# Patient Record
Sex: MALE | Race: BLACK OR AFRICAN AMERICAN | NOT HISPANIC OR LATINO | Employment: FULL TIME | URBAN - METROPOLITAN AREA
[De-identification: names, ages, dates, MRNs, and addresses within clinical notes are randomized per-mention and may not be internally consistent; named-entity substitution may affect disease eponyms.]

---

## 2017-05-22 ENCOUNTER — HISTORICAL (OUTPATIENT)
Dept: ADMINISTRATIVE | Facility: HOSPITAL | Age: 52
End: 2017-05-22

## 2017-05-22 LAB — CRC RECOMMENDATION EXT: NORMAL

## 2021-08-25 ENCOUNTER — HISTORICAL (OUTPATIENT)
Dept: FAMILY MEDICINE | Facility: CLINIC | Age: 56
End: 2021-08-25

## 2021-08-25 LAB
ABS NEUT (OLG): 3.44 X10(3)/MCL (ref 2.1–9.2)
ALBUMIN SERPL-MCNC: 4.1 GM/DL (ref 3.5–5)
ALBUMIN/GLOB SERPL: 1 RATIO (ref 1.1–2)
ALP SERPL-CCNC: 48 UNIT/L (ref 40–150)
ALT SERPL-CCNC: 29 UNIT/L (ref 0–55)
AST SERPL-CCNC: 23 UNIT/L (ref 5–34)
BASOPHILS NFR BLD MANUAL: 0 %
BILIRUB SERPL-MCNC: 0.2 MG/DL
BILIRUBIN DIRECT+TOT PNL SERPL-MCNC: 0.1 MG/DL (ref 0–0.5)
BILIRUBIN DIRECT+TOT PNL SERPL-MCNC: 0.1 MG/DL (ref 0–0.8)
BUN SERPL-MCNC: 11.9 MG/DL (ref 8.4–25.7)
CALCIUM SERPL-MCNC: 9.9 MG/DL (ref 8.4–10.2)
CHLORIDE SERPL-SCNC: 106 MMOL/L (ref 98–107)
CHOLEST SERPL-MCNC: 222 MG/DL
CHOLEST/HDLC SERPL: 5 {RATIO} (ref 0–5)
CO2 SERPL-SCNC: 25 MMOL/L (ref 22–29)
CREAT SERPL-MCNC: 1.32 MG/DL (ref 0.73–1.18)
EOSINOPHIL NFR BLD MANUAL: 1 %
ERYTHROCYTE [DISTWIDTH] IN BLOOD BY AUTOMATED COUNT: 14.2 % (ref 11.5–14.5)
EST. AVERAGE GLUCOSE BLD GHB EST-MCNC: 111.2 MG/DL
GLOBULIN SER-MCNC: 4.1 GM/DL (ref 2.4–3.5)
GLUCOSE SERPL-MCNC: 96 MG/DL (ref 74–100)
GRANULOCYTES NFR BLD MANUAL: 38 % (ref 43–75)
HBA1C MFR BLD: 5.5 %
HCT VFR BLD AUTO: 46.7 % (ref 40–51)
HDLC SERPL-MCNC: 46 MG/DL (ref 35–60)
HGB BLD-MCNC: 15.4 GM/DL (ref 13.5–17.5)
LDLC SERPL CALC-MCNC: 141 MG/DL (ref 50–140)
LYMPHOCYTES NFR BLD MANUAL: 58 % (ref 20.5–51.1)
MCH RBC QN AUTO: 28.3 PG (ref 26–34)
MCHC RBC AUTO-ENTMCNC: 33 GM/DL (ref 31–37)
MCV RBC AUTO: 85.7 FL (ref 80–100)
MONOCYTES NFR BLD MANUAL: 3 % (ref 2–9)
PLATELET # BLD AUTO: 318 X10(3)/MCL (ref 130–400)
PLATELET # BLD EST: ADEQUATE 10*3/UL
PMV BLD AUTO: 11 FL (ref 7.4–10.4)
POTASSIUM SERPL-SCNC: 4.5 MMOL/L (ref 3.5–5.1)
PROT SERPL-MCNC: 8.2 GM/DL (ref 6.4–8.3)
PSA SERPL-MCNC: 0.33 NG/ML
RBC # BLD AUTO: 5.45 X10(6)/MCL (ref 4.5–5.9)
RBC MORPH BLD: NORMAL
SODIUM SERPL-SCNC: 142 MMOL/L (ref 136–145)
TRIGL SERPL-MCNC: 173 MG/DL (ref 34–140)
TSH SERPL-ACNC: 0.62 UIU/ML (ref 0.35–4.94)
VLDLC SERPL CALC-MCNC: 35 MG/DL
WBC # SPEC AUTO: 9.8 X10(3)/MCL (ref 4.5–11)

## 2021-09-10 ENCOUNTER — HISTORICAL (OUTPATIENT)
Dept: RADIOLOGY | Facility: HOSPITAL | Age: 56
End: 2021-09-10

## 2021-09-20 ENCOUNTER — HISTORICAL (OUTPATIENT)
Dept: ADMINISTRATIVE | Facility: HOSPITAL | Age: 56
End: 2021-09-20

## 2021-09-21 ENCOUNTER — HISTORICAL (OUTPATIENT)
Dept: ADMINISTRATIVE | Facility: HOSPITAL | Age: 56
End: 2021-09-21

## 2021-09-21 LAB
BUN SERPL-MCNC: 10.7 MG/DL (ref 8.4–25.7)
CALCIUM SERPL-MCNC: 10.6 MG/DL (ref 8.4–10.2)
CHLORIDE SERPL-SCNC: 102 MMOL/L (ref 98–107)
CO2 SERPL-SCNC: 30 MMOL/L (ref 22–29)
CREAT SERPL-MCNC: 1.1 MG/DL (ref 0.73–1.18)
CREAT/UREA NIT SERPL: 10
GLUCOSE SERPL-MCNC: 93 MG/DL (ref 74–100)
POTASSIUM SERPL-SCNC: 4.4 MMOL/L (ref 3.5–5.1)
SODIUM SERPL-SCNC: 140 MMOL/L (ref 136–145)

## 2021-12-21 ENCOUNTER — HISTORICAL (OUTPATIENT)
Dept: ADMINISTRATIVE | Facility: HOSPITAL | Age: 56
End: 2021-12-21

## 2021-12-21 LAB — TESTOST SERPL-MCNC: 437.17 NG/DL (ref 220.91–715.81)

## 2022-04-10 ENCOUNTER — HISTORICAL (OUTPATIENT)
Dept: ADMINISTRATIVE | Facility: HOSPITAL | Age: 57
End: 2022-04-10
Payer: COMMERCIAL

## 2022-04-27 VITALS
HEIGHT: 68 IN | DIASTOLIC BLOOD PRESSURE: 75 MMHG | BODY MASS INDEX: 24.96 KG/M2 | OXYGEN SATURATION: 98 % | SYSTOLIC BLOOD PRESSURE: 125 MMHG | WEIGHT: 164.69 LBS

## 2022-04-30 NOTE — OP NOTE
Patient:   Harjit Handley             MRN: [id number]            FIN: [id number]               Age:   51 years     Sex:  Male     :  1965   Associated Diagnoses:   None   Author:   Pooja Harper MD      Operative Note   Operative Information   Date/ Time:  2017 08:14:00.     Procedures Performed: Colonoscopy with biopsy.     Preoperative Diagnosis: Family history of colon cancer.     Postoperative Diagnosis: 1.  Colon polyps, small, descending colon ×2.  Biopsies done  2.  Internal hemorrhoids grade 1-2.  Otherwise normal exam preparation was fair.     Surgeon: Pooja Harper MD.     Assistant: Jese Qiu Jones, [place].     Anesthesia: Per anesthesia service.     Speciman Removed: Yes.     Esimated blood loss: loss  3  cc.     Description of Procedure/Findings/    Complications: History and physical as per preoperative note.  Informed consent was obtained.  Patient was placed in left lateral position.  Sedation per anesthesia service.  Rectal exam was unremarkable.  Olympus video colonoscope was introduced into the rectum and was carefully advanced to cecum.  Quality of the prep was fair after thorough irrigation..     Findings: Cecum, ascending colon and transverse colon appeared unremarkable.  There were 2 small polyps, 3-4 mm, noted in proximal descending colon.  Biopsies were done.  Sigmoid colon and rectum appeared unremarkable except grade 1-2 internal hemorrhoids noted on withdrawal of the scope.  Estimated withdrawal time from cecum to rectum was 19 minutes and 18 seconds.  There were no complications..     Complications: None.     Recommendations:  1.  Follow biopsy results.  2.  Repeat colonoscopy in 5 years pending biopsy results in view of family history of colon cancer..

## 2022-07-08 RX ORDER — ROSUVASTATIN CALCIUM 10 MG/1
TABLET, COATED ORAL
Qty: 30 TABLET | Refills: 11 | Status: SHIPPED | OUTPATIENT
Start: 2022-07-08 | End: 2022-09-21 | Stop reason: SDUPTHER

## 2022-07-08 RX ORDER — AMLODIPINE BESYLATE 10 MG/1
TABLET ORAL
Qty: 30 TABLET | Refills: 11 | Status: SHIPPED | OUTPATIENT
Start: 2022-07-08 | End: 2022-09-21

## 2022-09-08 DIAGNOSIS — Z12.2 SCREENING FOR MALIGNANT NEOPLASM OF RESPIRATORY ORGAN: Primary | ICD-10-CM

## 2022-09-21 ENCOUNTER — OFFICE VISIT (OUTPATIENT)
Dept: FAMILY MEDICINE | Facility: CLINIC | Age: 57
End: 2022-09-21
Payer: COMMERCIAL

## 2022-09-21 VITALS
OXYGEN SATURATION: 100 % | WEIGHT: 156.19 LBS | RESPIRATION RATE: 20 BRPM | HEART RATE: 100 BPM | TEMPERATURE: 98 F | HEIGHT: 68 IN | DIASTOLIC BLOOD PRESSURE: 81 MMHG | SYSTOLIC BLOOD PRESSURE: 136 MMHG | BODY MASS INDEX: 23.67 KG/M2

## 2022-09-21 DIAGNOSIS — R09.81 NASAL CONGESTION: ICD-10-CM

## 2022-09-21 DIAGNOSIS — K21.9 GASTROESOPHAGEAL REFLUX DISEASE, UNSPECIFIED WHETHER ESOPHAGITIS PRESENT: ICD-10-CM

## 2022-09-21 DIAGNOSIS — K21.9 GASTROESOPHAGEAL REFLUX DISEASE WITHOUT ESOPHAGITIS: ICD-10-CM

## 2022-09-21 DIAGNOSIS — E78.49 OTHER HYPERLIPIDEMIA: ICD-10-CM

## 2022-09-21 DIAGNOSIS — N52.9 ERECTILE DYSFUNCTION, UNSPECIFIED ERECTILE DYSFUNCTION TYPE: ICD-10-CM

## 2022-09-21 DIAGNOSIS — N52.8 OTHER MALE ERECTILE DYSFUNCTION: ICD-10-CM

## 2022-09-21 DIAGNOSIS — F32.A DEPRESSION, UNSPECIFIED DEPRESSION TYPE: ICD-10-CM

## 2022-09-21 DIAGNOSIS — I10 PRIMARY HYPERTENSION: ICD-10-CM

## 2022-09-21 DIAGNOSIS — E78.5 HYPERLIPIDEMIA, UNSPECIFIED HYPERLIPIDEMIA TYPE: ICD-10-CM

## 2022-09-21 DIAGNOSIS — I10 HYPERTENSION, UNSPECIFIED TYPE: Primary | ICD-10-CM

## 2022-09-21 DIAGNOSIS — R79.89 LOW VITAMIN D LEVEL: ICD-10-CM

## 2022-09-21 DIAGNOSIS — K59.00 CONSTIPATION, UNSPECIFIED CONSTIPATION TYPE: ICD-10-CM

## 2022-09-21 DIAGNOSIS — G47.00 INSOMNIA, UNSPECIFIED TYPE: ICD-10-CM

## 2022-09-21 DIAGNOSIS — Z86.010 HISTORY OF ADENOMATOUS POLYP OF COLON: ICD-10-CM

## 2022-09-21 DIAGNOSIS — F51.01 PRIMARY INSOMNIA: ICD-10-CM

## 2022-09-21 LAB
ALBUMIN SERPL-MCNC: 4.2 GM/DL (ref 3.5–5)
ALBUMIN/GLOB SERPL: 1 RATIO (ref 1.1–2)
ALP SERPL-CCNC: 62 UNIT/L (ref 40–150)
ALT SERPL-CCNC: 20 UNIT/L (ref 0–55)
AST SERPL-CCNC: 23 UNIT/L (ref 5–34)
BASOPHILS # BLD AUTO: 0.09 X10(3)/MCL (ref 0–0.2)
BASOPHILS NFR BLD AUTO: 0.7 %
BILIRUBIN DIRECT+TOT PNL SERPL-MCNC: 0.4 MG/DL
BUN SERPL-MCNC: 13.5 MG/DL (ref 8.4–25.7)
CALCIUM SERPL-MCNC: 10.2 MG/DL (ref 8.4–10.2)
CHLORIDE SERPL-SCNC: 104 MMOL/L (ref 98–107)
CHOLEST SERPL-MCNC: 149 MG/DL
CHOLEST/HDLC SERPL: 4 {RATIO} (ref 0–5)
CO2 SERPL-SCNC: 24 MMOL/L (ref 22–29)
CREAT SERPL-MCNC: 1.13 MG/DL (ref 0.73–1.18)
EOSINOPHIL # BLD AUTO: 0.14 X10(3)/MCL (ref 0–0.9)
EOSINOPHIL NFR BLD AUTO: 1.1 %
ERYTHROCYTE [DISTWIDTH] IN BLOOD BY AUTOMATED COUNT: 15 % (ref 11.5–17)
EST. AVERAGE GLUCOSE BLD GHB EST-MCNC: 102.5 MG/DL
FLUAV AG UPPER RESP QL IA.RAPID: NOT DETECTED
FLUBV AG UPPER RESP QL IA.RAPID: NOT DETECTED
GFR SERPLBLD CREATININE-BSD FMLA CKD-EPI: >60 MLS/MIN/1.73/M2
GLOBULIN SER-MCNC: 4.4 GM/DL (ref 2.4–3.5)
GLUCOSE SERPL-MCNC: 96 MG/DL (ref 74–100)
HBA1C MFR BLD: 5.2 %
HCT VFR BLD AUTO: 47.4 % (ref 42–52)
HDLC SERPL-MCNC: 36 MG/DL (ref 35–60)
HGB BLD-MCNC: 15.4 GM/DL (ref 14–18)
IMM GRANULOCYTES # BLD AUTO: 0.03 X10(3)/MCL (ref 0–0.04)
IMM GRANULOCYTES NFR BLD AUTO: 0.2 %
LDLC SERPL CALC-MCNC: 87 MG/DL (ref 50–140)
LYMPHOCYTES # BLD AUTO: 5.48 X10(3)/MCL (ref 0.6–4.6)
LYMPHOCYTES NFR BLD AUTO: 43.6 %
MCH RBC QN AUTO: 27.5 PG (ref 27–31)
MCHC RBC AUTO-ENTMCNC: 32.5 MG/DL (ref 33–36)
MCV RBC AUTO: 84.5 FL (ref 80–94)
MONOCYTES # BLD AUTO: 0.95 X10(3)/MCL (ref 0.1–1.3)
MONOCYTES NFR BLD AUTO: 7.6 %
NEUTROPHILS # BLD AUTO: 5.9 X10(3)/MCL (ref 2.1–9.2)
NEUTROPHILS NFR BLD AUTO: 46.8 %
NRBC BLD AUTO-RTO: 0 %
PLATELET # BLD AUTO: 405 X10(3)/MCL (ref 130–400)
PMV BLD AUTO: 10.1 FL (ref 7.4–10.4)
POTASSIUM SERPL-SCNC: 4.5 MMOL/L (ref 3.5–5.1)
PROT SERPL-MCNC: 8.6 GM/DL (ref 6.4–8.3)
RBC # BLD AUTO: 5.61 X10(6)/MCL (ref 4.7–6.1)
SARS-COV-2 RNA RESP QL NAA+PROBE: NOT DETECTED
SODIUM SERPL-SCNC: 140 MMOL/L (ref 136–145)
T4 FREE SERPL-MCNC: 0.97 NG/DL (ref 0.7–1.48)
TRIGL SERPL-MCNC: 128 MG/DL (ref 34–140)
TSH SERPL-ACNC: 0.89 UIU/ML (ref 0.35–4.94)
VLDLC SERPL CALC-MCNC: 26 MG/DL
WBC # SPEC AUTO: 12.6 X10(3)/MCL (ref 4.5–11.5)

## 2022-09-21 PROCEDURE — 80053 COMPREHEN METABOLIC PANEL: CPT | Performed by: STUDENT IN AN ORGANIZED HEALTH CARE EDUCATION/TRAINING PROGRAM

## 2022-09-21 PROCEDURE — 3079F PR MOST RECENT DIASTOLIC BLOOD PRESSURE 80-89 MM HG: ICD-10-PCS | Mod: CPTII,,, | Performed by: STUDENT IN AN ORGANIZED HEALTH CARE EDUCATION/TRAINING PROGRAM

## 2022-09-21 PROCEDURE — 87636 SARSCOV2 & INF A&B AMP PRB: CPT | Performed by: STUDENT IN AN ORGANIZED HEALTH CARE EDUCATION/TRAINING PROGRAM

## 2022-09-21 PROCEDURE — 84439 ASSAY OF FREE THYROXINE: CPT | Performed by: STUDENT IN AN ORGANIZED HEALTH CARE EDUCATION/TRAINING PROGRAM

## 2022-09-21 PROCEDURE — 90472 IMMUNIZATION ADMIN EACH ADD: CPT | Mod: PBBFAC,PN

## 2022-09-21 PROCEDURE — 3075F PR MOST RECENT SYSTOLIC BLOOD PRESS GE 130-139MM HG: ICD-10-PCS | Mod: CPTII,,, | Performed by: STUDENT IN AN ORGANIZED HEALTH CARE EDUCATION/TRAINING PROGRAM

## 2022-09-21 PROCEDURE — 90732 PPSV23 VACC 2 YRS+ SUBQ/IM: CPT | Mod: PBBFAC,PN

## 2022-09-21 PROCEDURE — 83036 HEMOGLOBIN GLYCOSYLATED A1C: CPT | Performed by: STUDENT IN AN ORGANIZED HEALTH CARE EDUCATION/TRAINING PROGRAM

## 2022-09-21 PROCEDURE — 3079F DIAST BP 80-89 MM HG: CPT | Mod: CPTII,,, | Performed by: STUDENT IN AN ORGANIZED HEALTH CARE EDUCATION/TRAINING PROGRAM

## 2022-09-21 PROCEDURE — 84443 ASSAY THYROID STIM HORMONE: CPT | Performed by: STUDENT IN AN ORGANIZED HEALTH CARE EDUCATION/TRAINING PROGRAM

## 2022-09-21 PROCEDURE — 3008F BODY MASS INDEX DOCD: CPT | Mod: CPTII,,, | Performed by: STUDENT IN AN ORGANIZED HEALTH CARE EDUCATION/TRAINING PROGRAM

## 2022-09-21 PROCEDURE — 36415 COLL VENOUS BLD VENIPUNCTURE: CPT | Performed by: STUDENT IN AN ORGANIZED HEALTH CARE EDUCATION/TRAINING PROGRAM

## 2022-09-21 PROCEDURE — 90471 IMMUNIZATION ADMIN: CPT | Mod: PBBFAC,PN

## 2022-09-21 PROCEDURE — 99214 OFFICE O/P EST MOD 30 MIN: CPT | Mod: S$PBB,,, | Performed by: STUDENT IN AN ORGANIZED HEALTH CARE EDUCATION/TRAINING PROGRAM

## 2022-09-21 PROCEDURE — 85025 COMPLETE CBC W/AUTO DIFF WBC: CPT | Performed by: STUDENT IN AN ORGANIZED HEALTH CARE EDUCATION/TRAINING PROGRAM

## 2022-09-21 PROCEDURE — 80061 LIPID PANEL: CPT | Performed by: STUDENT IN AN ORGANIZED HEALTH CARE EDUCATION/TRAINING PROGRAM

## 2022-09-21 PROCEDURE — 99214 PR OFFICE/OUTPT VISIT, EST, LEVL IV, 30-39 MIN: ICD-10-PCS | Mod: S$PBB,,, | Performed by: STUDENT IN AN ORGANIZED HEALTH CARE EDUCATION/TRAINING PROGRAM

## 2022-09-21 PROCEDURE — 3075F SYST BP GE 130 - 139MM HG: CPT | Mod: CPTII,,, | Performed by: STUDENT IN AN ORGANIZED HEALTH CARE EDUCATION/TRAINING PROGRAM

## 2022-09-21 PROCEDURE — 99213 OFFICE O/P EST LOW 20 MIN: CPT | Mod: PBBFAC,PN | Performed by: STUDENT IN AN ORGANIZED HEALTH CARE EDUCATION/TRAINING PROGRAM

## 2022-09-21 PROCEDURE — 3008F PR BODY MASS INDEX (BMI) DOCUMENTED: ICD-10-PCS | Mod: CPTII,,, | Performed by: STUDENT IN AN ORGANIZED HEALTH CARE EDUCATION/TRAINING PROGRAM

## 2022-09-21 RX ORDER — POLYETHYLENE GLYCOL 3350 17 G/17G
17 POWDER, FOR SOLUTION ORAL DAILY
Qty: 595 G | Refills: 11 | Status: SHIPPED | OUTPATIENT
Start: 2022-09-21 | End: 2024-01-22 | Stop reason: SDUPTHER

## 2022-09-21 RX ORDER — PANTOPRAZOLE SODIUM 40 MG/1
40 TABLET, DELAYED RELEASE ORAL DAILY
Qty: 30 TABLET | Refills: 11 | Status: SHIPPED | OUTPATIENT
Start: 2022-09-21 | End: 2023-10-25

## 2022-09-21 RX ORDER — ROSUVASTATIN CALCIUM 10 MG/1
10 TABLET, COATED ORAL DAILY
Qty: 30 TABLET | Refills: 11 | Status: SHIPPED | OUTPATIENT
Start: 2022-09-21 | End: 2023-08-01

## 2022-09-21 RX ORDER — SILDENAFIL 50 MG/1
50 TABLET, FILM COATED ORAL DAILY PRN
Qty: 30 TABLET | Refills: 11 | Status: SHIPPED | OUTPATIENT
Start: 2022-09-21 | End: 2024-01-22

## 2022-09-21 RX ORDER — TRAZODONE HYDROCHLORIDE 50 MG/1
50 TABLET ORAL NIGHTLY
Qty: 30 TABLET | Refills: 11 | Status: SHIPPED | OUTPATIENT
Start: 2022-09-21 | End: 2023-09-29 | Stop reason: SDUPTHER

## 2022-09-21 RX ORDER — LOSARTAN POTASSIUM AND HYDROCHLOROTHIAZIDE 12.5; 5 MG/1; MG/1
1 TABLET ORAL DAILY
Qty: 90 TABLET | Refills: 3 | Status: SHIPPED | OUTPATIENT
Start: 2022-09-21 | End: 2022-10-26 | Stop reason: SDUPTHER

## 2022-09-21 RX ADMIN — PNEUMOCOCCAL VACCINE POLYVALENT 0.5 ML
25; 25; 25; 25; 25; 25; 25; 25; 25; 25; 25; 25; 25; 25; 25; 25; 25; 25; 25; 25; 25; 25; 25 INJECTION, SOLUTION INTRAMUSCULAR; SUBCUTANEOUS at 08:09

## 2022-09-21 NOTE — PROGRESS NOTES
Subjective:       Patient ID: Harjit Handley is a 57 y.o. male.    Chief Complaint: Follow-up    Follow-up  Pertinent negatives include no change in bowel habit, chest pain, chills, congestion, fever, nausea, vomiting or weakness. 56-year-old male presents to clinic for follow-up      HTN  amlodipine 10 mg  Patient has been compliant with medication  Turned in BP logs showing all readings greater than 140/90  Would like to change medication    Tobacco use  -is now smoking 1 pack/day would like help with cessation  - Did sign commit to quit  -referring to smoking cessation    HLD  Patient with total cholesterol of 222 and elevated LDL  Crestor 10 mg Tolerating well    Elevated Creatinine  -Noted on lab work from first visit. Slightly decreased GFR  -    Depression/Insomnia  - occasional. Has been on medication in the past. Does not need medication at this time. NO SI Or HI  Stable on Trazodone 50 mg. States he takes half two to 3 days per week and whole on weekends.    Abdominal Pain/Constipation/History of 3 tubular adenomas (2017) descending colon  Started on Miralax-doing well  2017 history of 3 tubular adenomas.  Needs referral back to [facility]    Erectile dysfunction  Has trouble maintaining erections. No chest pain or other alarm symptoms. Viagra 50 mg tablets prescribed, patient states that this medication is working well for him and would like refills    Decreased appetite   States he snacks during the day but is often not hungry at night.  Reports no alarm symptoms but at times GERD   States he was positive for h. Pylori in the past    HM  Shingles 9/21/2021 and 12/21/2021  Pneumo 23 9/21/2022  Flu- 9/21/2022  Dtap 9/21/2021  Influenza 10/30/2021  Colonoscopy 2017 with 3 tubular adenomas. Referral placed  [place] lung cancer screening-9/20/2021 BI-RADS 2    Review of Systems   Constitutional:  Negative for chills, fever and unexpected weight change.   HENT:  Negative for nasal congestion, postnasal drip  and sinus pressure/congestion.    Respiratory:  Negative for shortness of breath and wheezing.    Cardiovascular:  Negative for chest pain and palpitations.   Gastrointestinal:  Positive for reflux. Negative for change in bowel habit, diarrhea, nausea, vomiting and change in bowel habit.   Genitourinary:  Positive for erectile dysfunction. Negative for dysuria, frequency and urgency.   Neurological:  Negative for syncope, weakness, coordination difficulties and coordination difficulties.   Psychiatric/Behavioral:  Negative for dysphoric mood.        Objective:      Physical Exam  Constitutional:       General: He is not in acute distress.  Eyes:      General: No scleral icterus.     Extraocular Movements: Extraocular movements intact.      Conjunctiva/sclera: Conjunctivae normal.      Pupils: Pupils are equal, round, and reactive to light.   Cardiovascular:      Rate and Rhythm: Normal rate and regular rhythm.      Heart sounds: No murmur heard.  Pulmonary:      Effort: Pulmonary effort is normal. No respiratory distress.      Breath sounds: No stridor. No wheezing or rhonchi.   Abdominal:      General: Bowel sounds are normal. There is no distension.      Palpations: Abdomen is soft.      Tenderness: There is no abdominal tenderness. There is no guarding.   Musculoskeletal:         General: No swelling. Normal range of motion.   Skin:     General: Skin is warm and dry.      Coloration: Skin is not jaundiced.      Findings: No rash.   Neurological:      Mental Status: He is alert.      Gait: Gait normal.   Psychiatric:         Thought Content: Thought content normal.       Assessment:       Problem List Items Addressed This Visit    None  Visit Diagnoses       Hypertension, unspecified type    -  Primary    Relevant Medications    losartan-hydrochlorothiazide 50-12.5 mg (HYZAAR) 50-12.5 mg per tablet    Other Relevant Orders    CBC Auto Differential    Comprehensive Metabolic Panel    Low vitamin D level         Relevant Orders    Vitamin D    Hyperlipidemia, unspecified hyperlipidemia type        Relevant Medications    rosuvastatin (CRESTOR) 10 MG tablet    Other Relevant Orders    Lipid Panel    T4, Free    TSH    Hemoglobin A1C    Nasal congestion        Relevant Orders    COVID/FLU A&B PCR    Insomnia, unspecified type        Relevant Medications    traZODone (DESYREL) 50 MG tablet    Depression, unspecified depression type        Relevant Medications    traZODone (DESYREL) 50 MG tablet    Constipation, unspecified constipation type        Relevant Medications    polyethylene glycol (GLYCOLAX) 17 gram/dose powder    Erectile dysfunction, unspecified erectile dysfunction type        Relevant Medications    sildenafiL (VIAGRA) 50 MG tablet              Plan:       Problem List Items Addressed This Visit          Cardiac/Vascular    Primary hypertension    Overview     Stopping amlodipine 10 mg  Starting HCtZ [id number]  CMP today to assess kidney function         Other hyperlipidemia    Overview     Crestor 10 mg   Repeat lipid             Renal/    Other male erectile dysfunction    Overview     Refilling viagra 50 mg             GI    Gastroesophageal reflux disease without esophagitis    Overview     H. Pylori  Protonix 40 mg             Other    Primary insomnia    Overview     Trazodone 50 mg with refills           Other Visit Diagnoses       Hypertension, unspecified type    -  Primary    Relevant Medications    losartan-hydrochlorothiazide 50-12.5 mg (HYZAAR) 50-12.5 mg per tablet    Other Relevant Orders    CBC Auto Differential    Comprehensive Metabolic Panel    Low vitamin D level        Relevant Orders    Vitamin D    Hyperlipidemia, unspecified hyperlipidemia type        Relevant Medications    rosuvastatin (CRESTOR) 10 MG tablet    Other Relevant Orders    Lipid Panel    T4, Free    TSH    Hemoglobin A1C    Nasal congestion        Relevant Orders    COVID/FLU A&B PCR    Insomnia, unspecified type         Relevant Medications    traZODone (DESYREL) 50 MG tablet    Depression, unspecified depression type        Relevant Medications    traZODone (DESYREL) 50 MG tablet    Constipation, unspecified constipation type        Relevant Medications    polyethylene glycol (GLYCOLAX) 17 gram/dose powder    Erectile dysfunction, unspecified erectile dysfunction type        Relevant Medications    sildenafiL (VIAGRA) 50 MG tablet    History of adenomatous polyp of colon        Relevant Orders    Ambulatory referral/consult to Gastroenterology    Gastroesophageal reflux disease, unspecified whether esophagitis present        Relevant Medications    pantoprazole (PROTONIX) 40 MG tablet    Other Relevant Orders    Ambulatory referral/consult to Gastroenterology    Helicobacter Pylori Antigen Fecal EIA         Follow up in about 2 weeks (around 10/5/2022) for Hypertension.

## 2022-09-22 ENCOUNTER — TELEPHONE (OUTPATIENT)
Dept: FAMILY MEDICINE | Facility: CLINIC | Age: 57
End: 2022-09-22
Payer: COMMERCIAL

## 2022-10-26 ENCOUNTER — OFFICE VISIT (OUTPATIENT)
Dept: FAMILY MEDICINE | Facility: CLINIC | Age: 57
End: 2022-10-26
Payer: COMMERCIAL

## 2022-10-26 VITALS
HEIGHT: 68 IN | DIASTOLIC BLOOD PRESSURE: 75 MMHG | OXYGEN SATURATION: 100 % | BODY MASS INDEX: 24.1 KG/M2 | SYSTOLIC BLOOD PRESSURE: 125 MMHG | HEART RATE: 81 BPM | RESPIRATION RATE: 20 BRPM | WEIGHT: 159 LBS | TEMPERATURE: 98 F

## 2022-10-26 DIAGNOSIS — I10 HYPERTENSION, UNSPECIFIED TYPE: ICD-10-CM

## 2022-10-26 DIAGNOSIS — Z12.11 ENCOUNTER FOR SCREENING FOR MALIGNANT NEOPLASM OF COLON: ICD-10-CM

## 2022-10-26 DIAGNOSIS — I10 PRIMARY HYPERTENSION: ICD-10-CM

## 2022-10-26 DIAGNOSIS — Z12.2 ENCOUNTER FOR SCREENING FOR LUNG CANCER: ICD-10-CM

## 2022-10-26 DIAGNOSIS — F17.200 CURRENT SMOKER: Primary | ICD-10-CM

## 2022-10-26 LAB
ALBUMIN SERPL-MCNC: 4.1 GM/DL (ref 3.5–5)
ALBUMIN/GLOB SERPL: 1.1 RATIO (ref 1.1–2)
ALP SERPL-CCNC: 51 UNIT/L (ref 40–150)
ALT SERPL-CCNC: 23 UNIT/L (ref 0–55)
AST SERPL-CCNC: 23 UNIT/L (ref 5–34)
BILIRUBIN DIRECT+TOT PNL SERPL-MCNC: 0.3 MG/DL
BUN SERPL-MCNC: 14.1 MG/DL (ref 8.4–25.7)
CALCIUM SERPL-MCNC: 10.3 MG/DL (ref 8.4–10.2)
CHLORIDE SERPL-SCNC: 101 MMOL/L (ref 98–107)
CO2 SERPL-SCNC: 28 MMOL/L (ref 22–29)
CREAT SERPL-MCNC: 1.18 MG/DL (ref 0.73–1.18)
GFR SERPLBLD CREATININE-BSD FMLA CKD-EPI: >60 MLS/MIN/1.73/M2
GLOBULIN SER-MCNC: 3.9 GM/DL (ref 2.4–3.5)
GLUCOSE SERPL-MCNC: 84 MG/DL (ref 74–100)
HAV IGM SERPL QL IA: NONREACTIVE
HBV CORE IGM SERPL QL IA: NONREACTIVE
HBV SURFACE AG SERPL QL IA: NONREACTIVE
HCV AB SERPL QL IA: NONREACTIVE
HIV 1+2 AB+HIV1 P24 AG SERPL QL IA: NONREACTIVE
POTASSIUM SERPL-SCNC: 4.4 MMOL/L (ref 3.5–5.1)
PROT SERPL-MCNC: 8 GM/DL (ref 6.4–8.3)
SODIUM SERPL-SCNC: 140 MMOL/L (ref 136–145)

## 2022-10-26 PROCEDURE — 1159F PR MEDICATION LIST DOCUMENTED IN MEDICAL RECORD: ICD-10-PCS | Mod: CPTII,,, | Performed by: STUDENT IN AN ORGANIZED HEALTH CARE EDUCATION/TRAINING PROGRAM

## 2022-10-26 PROCEDURE — 99213 PR OFFICE/OUTPT VISIT, EST, LEVL III, 20-29 MIN: ICD-10-PCS | Mod: S$PBB,,, | Performed by: STUDENT IN AN ORGANIZED HEALTH CARE EDUCATION/TRAINING PROGRAM

## 2022-10-26 PROCEDURE — 3078F PR MOST RECENT DIASTOLIC BLOOD PRESSURE < 80 MM HG: ICD-10-PCS | Mod: CPTII,,, | Performed by: STUDENT IN AN ORGANIZED HEALTH CARE EDUCATION/TRAINING PROGRAM

## 2022-10-26 PROCEDURE — 87389 HIV-1 AG W/HIV-1&-2 AB AG IA: CPT | Performed by: STUDENT IN AN ORGANIZED HEALTH CARE EDUCATION/TRAINING PROGRAM

## 2022-10-26 PROCEDURE — 3078F DIAST BP <80 MM HG: CPT | Mod: CPTII,,, | Performed by: STUDENT IN AN ORGANIZED HEALTH CARE EDUCATION/TRAINING PROGRAM

## 2022-10-26 PROCEDURE — 99213 OFFICE O/P EST LOW 20 MIN: CPT | Mod: S$PBB,,, | Performed by: STUDENT IN AN ORGANIZED HEALTH CARE EDUCATION/TRAINING PROGRAM

## 2022-10-26 PROCEDURE — 36415 COLL VENOUS BLD VENIPUNCTURE: CPT | Performed by: STUDENT IN AN ORGANIZED HEALTH CARE EDUCATION/TRAINING PROGRAM

## 2022-10-26 PROCEDURE — 3074F SYST BP LT 130 MM HG: CPT | Mod: CPTII,,, | Performed by: STUDENT IN AN ORGANIZED HEALTH CARE EDUCATION/TRAINING PROGRAM

## 2022-10-26 PROCEDURE — 1159F MED LIST DOCD IN RCRD: CPT | Mod: CPTII,,, | Performed by: STUDENT IN AN ORGANIZED HEALTH CARE EDUCATION/TRAINING PROGRAM

## 2022-10-26 PROCEDURE — 99215 OFFICE O/P EST HI 40 MIN: CPT | Mod: PBBFAC,PN | Performed by: STUDENT IN AN ORGANIZED HEALTH CARE EDUCATION/TRAINING PROGRAM

## 2022-10-26 PROCEDURE — 3074F PR MOST RECENT SYSTOLIC BLOOD PRESSURE < 130 MM HG: ICD-10-PCS | Mod: CPTII,,, | Performed by: STUDENT IN AN ORGANIZED HEALTH CARE EDUCATION/TRAINING PROGRAM

## 2022-10-26 RX ORDER — LOSARTAN POTASSIUM AND HYDROCHLOROTHIAZIDE 12.5; 5 MG/1; MG/1
1 TABLET ORAL DAILY
Qty: 90 TABLET | Refills: 3 | Status: SHIPPED | OUTPATIENT
Start: 2022-10-26 | End: 2023-08-02

## 2022-10-26 NOTE — PROGRESS NOTES
Subjective:       Patient ID: Harjit Handley is a 57 y.o. male.    Chief Complaint: Follow-up    Follow-up  Pertinent negatives include no change in bowel habit, chest pain, chills, congestion, fever, nausea, vomiting or weakness. 56-year-old male presents to clinic for follow-up      HTN  Switched to HCTZ-lsinopril last visit from amlodipine. Presents with BP log showing all less than 140/90  Reports no issues with medication    Tobacco use  -is now smoking 1 pack/day would like help with cessation  - Did sign commit to quit  -referring to smoking cessation    HLD  Patient with total cholesterol of 222 and elevated LDL  Crestor 10 mg Tolerating well    Elevated Creatinine  -Noted on lab work from first visit. Slightly decreased GFR  -    Depression/Insomnia  - occasional. Has been on medication in the past. Does not need medication at this time. NO SI Or HI  Stable on Trazodone 50 mg. States he takes half two to 3 days per week and whole on weekends.    Abdominal Pain/Constipation/History of 3 tubular adenomas (2017) descending colon  Started on Miralax-doing well  2017 history of 3 tubular adenomas.  Referred back to [facility]      Erectile dysfunction  Has trouble maintaining erections. No chest pain or other alarm symptoms. Viagra 50 mg tablets prescribed, patient states that this medication is working well for him and would like refills    HM  Shingles 9/21/2021 and 12/21/2021  Pneumo 23 9/21/2022  Flu- 9/21/2022  Dtap 9/21/2021  Influenza 10/30/2021  Colonoscopy 2017 with 3 tubular adenomas. Referral placed  [place] lung cancer screening-9/20/2021 BI-RADS 2;placed repeat this visit 10/26/2022    Review of Systems   Constitutional:  Negative for chills, fever and unexpected weight change.   HENT:  Negative for nasal congestion, postnasal drip and sinus pressure/congestion.    Respiratory:  Negative for shortness of breath and wheezing.    Cardiovascular:  Negative for chest pain and palpitations.    Gastrointestinal:  Positive for reflux. Negative for change in bowel habit, diarrhea, nausea, vomiting and change in bowel habit.   Genitourinary:  Positive for erectile dysfunction. Negative for dysuria, frequency and urgency.   Neurological:  Negative for syncope, weakness, coordination difficulties and coordination difficulties.   Psychiatric/Behavioral:  Negative for dysphoric mood.        Objective:      Physical Exam  Constitutional:       General: He is not in acute distress.  Eyes:      General: No scleral icterus.     Extraocular Movements: Extraocular movements intact.      Conjunctiva/sclera: Conjunctivae normal.      Pupils: Pupils are equal, round, and reactive to light.   Cardiovascular:      Rate and Rhythm: Normal rate and regular rhythm.      Heart sounds: No murmur heard.  Pulmonary:      Effort: Pulmonary effort is normal. No respiratory distress.      Breath sounds: No stridor. No wheezing or rhonchi.   Abdominal:      General: Bowel sounds are normal. There is no distension.      Palpations: Abdomen is soft.      Tenderness: There is no abdominal tenderness. There is no guarding.   Musculoskeletal:         General: No swelling. Normal range of motion.   Skin:     General: Skin is warm and dry.      Coloration: Skin is not jaundiced.      Findings: No rash.   Neurological:      Mental Status: He is alert.      Gait: Gait normal.   Psychiatric:         Thought Content: Thought content normal.       Assessment:       Problem List Items Addressed This Visit          Cardiac/Vascular    Primary hypertension     Other Visit Diagnoses       Current smoker    -  Primary    Relevant Orders    Ambulatory referral/consult to Smoking Cessation Program    Ambulatory referral/consult to Smoking Cessation Program    Encounter for screening for lung cancer        Relevant Orders    CT Chest Lung Screening Low Dose    Hypertension, unspecified type        Relevant Medications    losartan-hydrochlorothiazide  50-12.5 mg (HYZAAR) 50-12.5 mg per tablet    Other Relevant Orders    Comprehensive Metabolic Panel (Completed)    HIV 1/2 Ag/Ab (4th Gen) (Completed)    Hepatitis Panel, Acute    Encounter for screening for malignant neoplasm of colon                  Plan:       Problem List Items Addressed This Visit          Cardiac/Vascular    Primary hypertension    Overview       Continue  HCtZ [id number]  CMP today to assess kidney function          Other Visit Diagnoses       Current smoker    -  Primary    Relevant Orders    Ambulatory referral/consult to Smoking Cessation Program    Ambulatory referral/consult to Smoking Cessation Program    Encounter for screening for lung cancer        Relevant Orders    CT Chest Lung Screening Low Dose    Hypertension, unspecified type        Relevant Medications    losartan-hydrochlorothiazide 50-12.5 mg (HYZAAR) 50-12.5 mg per tablet    Other Relevant Orders    Comprehensive Metabolic Panel (Completed)    HIV 1/2 Ag/Ab (4th Gen) (Completed)    Hepatitis Panel, Acute    Encounter for screening for malignant neoplasm of colon             Follow up in about 6 months (around 4/26/2023) for Hypertension.

## 2022-10-27 LAB — PATH REV: NORMAL

## 2022-11-29 ENCOUNTER — CLINICAL SUPPORT (OUTPATIENT)
Dept: SMOKING CESSATION | Facility: CLINIC | Age: 57
End: 2022-11-29

## 2022-11-29 DIAGNOSIS — F17.200 NICOTINE DEPENDENCE: Primary | ICD-10-CM

## 2022-11-29 PROCEDURE — 99404 PR PREVENT COUNSEL,INDIV,60 MIN: ICD-10-PCS | Mod: S$GLB,,, | Performed by: INTERNAL MEDICINE

## 2022-11-29 PROCEDURE — 99404 PREV MED CNSL INDIV APPRX 60: CPT | Mod: S$GLB,,, | Performed by: INTERNAL MEDICINE

## 2022-11-29 RX ORDER — IBUPROFEN 200 MG
1 TABLET ORAL DAILY
Qty: 28 PATCH | Refills: 0 | Status: SHIPPED | OUTPATIENT
Start: 2022-11-29 | End: 2023-01-03 | Stop reason: SDUPTHER

## 2022-11-29 RX ORDER — MICONAZOLE NITRATE 2 %
2 CREAM (GRAM) TOPICAL
Qty: 100 EACH | Refills: 0 | Status: SHIPPED | OUTPATIENT
Start: 2022-11-29 | End: 2023-02-13 | Stop reason: SDUPTHER

## 2022-11-29 NOTE — PROGRESS NOTES
Patient will be participating in biweekly tobacco cessation meetings and will begin the prescribed tobacco cessation medication regimen of 21 mg nicotine patch QD and 2 mg nicotine gum PRN (1-2 per hour in place of cigarettes). Patient currently smokes 30-40 cigarettes per day.  Discussed the role of tobacco cessation program, role of nicotine replacement therapy (NRT) and behavioral changes to assist the patient to reach his goal of being tobacco free. Education and instruction on the role of the NRT, usage and proper placement of the patch and gum. Patient verbalized understanding and willingness to use the patch and gum. Pt started on rate reduction and wait time of 15 min prior to smoking. Pt's exhaled carbon monoxide level was 20 ppm as per [employer]oker[employer]. (non- smoker = 0-5 ppm.)

## 2022-12-20 ENCOUNTER — CLINICAL SUPPORT (OUTPATIENT)
Dept: SMOKING CESSATION | Facility: CLINIC | Age: 57
End: 2022-12-20

## 2022-12-20 DIAGNOSIS — F17.200 NICOTINE DEPENDENCE: Primary | ICD-10-CM

## 2022-12-20 PROCEDURE — 99402 PREV MED CNSL INDIV APPRX 30: CPT | Mod: S$GLB,,, | Performed by: INTERNAL MEDICINE

## 2022-12-20 PROCEDURE — 99402 PR PREVENT COUNSEL,INDIV,30 MIN: ICD-10-PCS | Mod: S$GLB,,, | Performed by: INTERNAL MEDICINE

## 2022-12-20 NOTE — PROGRESS NOTES
Individual Follow-Up Form    12/20/2022    Quit Date:     Clinical Status of Patient: Outpatient      Continuing Medication: yes  Patches or Nicotine gum       Target Symptoms: Withdrawal and medication side effects. The following were  rated moderate (3) to severe (4) on TCRS:  Moderate (3): none  Severe (4): none    Comments: Spoke with pt via phone regarding smoking cessation progress.  Pt is smoking 10-20 cigarettes per day.  Pt remains on tobacco cessation regimen of 21 mg nicotine patch QD and 2 mg nicotine gum PRN.  No adverse effects noted at this time.  Pt doing well with rate reduction and wait times prior to smoking. Reviewed learned addiction model, personal reasons for quitting, medications, goals, quit date.     Diagnosis: F17.200    Next Visit: 2 weeks

## 2022-12-23 ENCOUNTER — PATIENT OUTREACH (OUTPATIENT)
Dept: ADMINISTRATIVE | Facility: HOSPITAL | Age: 57
End: 2022-12-23
Payer: COMMERCIAL

## 2023-01-03 ENCOUNTER — CLINICAL SUPPORT (OUTPATIENT)
Dept: SMOKING CESSATION | Facility: CLINIC | Age: 58
End: 2023-01-03

## 2023-01-03 DIAGNOSIS — F17.200 NICOTINE DEPENDENCE: Primary | ICD-10-CM

## 2023-01-03 PROCEDURE — 99401 PREV MED CNSL INDIV APPRX 15: CPT | Mod: S$GLB,,, | Performed by: INTERNAL MEDICINE

## 2023-01-03 PROCEDURE — 99401 PR PREVENT COUNSEL,INDIV,15 MIN: ICD-10-PCS | Mod: S$GLB,,, | Performed by: INTERNAL MEDICINE

## 2023-01-03 RX ORDER — IBUPROFEN 200 MG
1 TABLET ORAL DAILY
Qty: 28 PATCH | Refills: 0 | Status: SHIPPED | OUTPATIENT
Start: 2023-01-03 | End: 2023-01-25 | Stop reason: SDUPTHER

## 2023-01-04 NOTE — PROGRESS NOTES
Individual Follow-Up Form    1/3/2023    Quit Date:     Clinical Status of Patient: Outpatient      Continuing Medication: yes  Patches or Nicotine gum       Target Symptoms: Withdrawal and medication side effects. The following were  rated moderate (3) to severe (4) on TCRS:  Moderate (3): none  Severe (4): none    Comments: Spoke with pt via phone regarding smoking cessation progress.  Pt called stating that he was not able to come in this morning due to work, therefore he called this evening.  Pt is smoking 8-15 cigarettes per day.  Pt remains on tobacco cessation regimen of 21 mg nicotine patch QD and 2 mg nicotine gum PRN.  No adverse effects noted at this time.  Reviewed strategies, cues, and triggers. Introduced the negative impact of tobacco on health, the health advantages of discontinuing the use of tobacco, time line improved health changes after a quit, withdrawal issues to expect from nicotine and habit, and ways to achieve the goal of a quit. Pt will continue to work on rate reduction and will decrease by 2 cigarettes each week.    Diagnosis: F17.200    Next Visit: 2 weeks

## 2023-01-25 ENCOUNTER — CLINICAL SUPPORT (OUTPATIENT)
Dept: SMOKING CESSATION | Facility: CLINIC | Age: 58
End: 2023-01-25

## 2023-01-25 DIAGNOSIS — F17.200 NICOTINE DEPENDENCE: Primary | ICD-10-CM

## 2023-01-25 PROCEDURE — 99403 PR PREVENT COUNSEL,INDIV,45 MIN: ICD-10-PCS | Mod: S$GLB,,,

## 2023-01-25 PROCEDURE — 99403 PREV MED CNSL INDIV APPRX 45: CPT | Mod: S$GLB,,,

## 2023-01-25 RX ORDER — IBUPROFEN 200 MG
1 TABLET ORAL DAILY
Qty: 28 PATCH | Refills: 0 | Status: SHIPPED | OUTPATIENT
Start: 2023-01-25 | End: 2023-06-01 | Stop reason: SDUPTHER

## 2023-01-25 NOTE — PROGRESS NOTES
Individual Follow-Up Form    1/25/2023    Quit Date:     Clinical Status of Patient: Outpatient    Continuing Medication: yes  Patches or Nicotine gum     Target Symptoms: Withdrawal and medication side effects. The following were  rated moderate (3) to severe (4) on TCRS:  Moderate (3): none  Severe (4): none    Comments: Patient presents for follow up smoking 10 cigarettes per day. Pt remains on tobacco cessation medication of 21 mg nicotine patch QD and 2 mg nicotine gum PRN (1-2 per hour in place of cigarettes).  No adverse effects noted at this time. Pt doing well with rate reduction and wait times prior to smoking. Pt encouraged to pick a quit day.  Pt stated that he will let me know his quit day during out next follow up appointment.  Pt stated that he is smoking more in the morning when he drinks his coffee.  Discussed waiting to smoke 15 minutes after he finishes his cup of coffee.  Reviewed strategies, controlling environment, cues, triggers, new goals set. Introduced high risk situations with preparation interventions, caffeine similarities with withdrawal issues of habit and nicotine, Alcohol, Understanding urges, cravings, stress and relaxation. Open discussion with intervention discussion. Pt will continue to work on rate reduction and will decrease by 2 cigarettes each week.    Diagnosis: F17.200    Next Visit: 2 weeks

## 2023-02-13 ENCOUNTER — CLINICAL SUPPORT (OUTPATIENT)
Dept: SMOKING CESSATION | Facility: CLINIC | Age: 58
End: 2023-02-13

## 2023-02-13 DIAGNOSIS — F17.200 NICOTINE DEPENDENCE: Primary | ICD-10-CM

## 2023-02-13 PROCEDURE — 99402 PREV MED CNSL INDIV APPRX 30: CPT | Mod: S$GLB,,,

## 2023-02-13 PROCEDURE — 99402 PR PREVENT COUNSEL,INDIV,30 MIN: ICD-10-PCS | Mod: S$GLB,,,

## 2023-02-13 RX ORDER — VARENICLINE TARTRATE 1 MG/1
TABLET, FILM COATED ORAL
Qty: 56 TABLET | Refills: 0 | Status: SHIPPED | OUTPATIENT
Start: 2023-02-13 | End: 2023-08-18

## 2023-02-13 RX ORDER — MICONAZOLE NITRATE 2 %
2 CREAM (GRAM) TOPICAL
Qty: 100 EACH | Refills: 0 | Status: SHIPPED | OUTPATIENT
Start: 2023-02-13 | End: 2023-08-18

## 2023-02-13 NOTE — PROGRESS NOTES
Individual Follow-Up Form    2/13/2023    Quit Date:     Clinical Status of Patient: Outpatient    Continuing Medication: yes  Nicotine gum    Other Medications: Chantix     Target Symptoms: Withdrawal and medication side effects. The following were  rated moderate (3) to severe (4) on TCRS:  Moderate (3): none  Severe (4): none    Comments: Spoke with pt via phone regarding smoking cessation progress.  Pt is smoking 10 cigarettes per day.  Pt remains on tobacco cessation regimen of 2 mg nicotine gum PRN.  No adverse effects noted at this time.  Pt stated that he has not been using the nicotine patches every day due to skin irritation.  Discussed possibly trying another smoking cessation medication.  Pt has had seizures in the past and had tried Wellbutrin and stated that it did not work.  Discussed Chantix, how to take it and side effects.  Pt agreed to try Chantix.  Will order Chantix starter dose and re-order 2 mg nicotine gum.  Reviewed coping strategies/habitual behavior/relapse prevention with patient.      Diagnosis: F17.200    Next Visit: 2 weeks

## 2023-02-23 ENCOUNTER — TELEPHONE (OUTPATIENT)
Dept: SMOKING CESSATION | Facility: CLINIC | Age: 58
End: 2023-02-23
Payer: COMMERCIAL

## 2023-02-23 NOTE — TELEPHONE ENCOUNTER
First attempt left message regarding smoking cessation quit 1 episode. Completed 3 month smart form per CTTS notes on 2/13/23.

## 2023-03-09 ENCOUNTER — TELEPHONE (OUTPATIENT)
Dept: SMOKING CESSATION | Facility: CLINIC | Age: 58
End: 2023-03-09
Payer: COMMERCIAL

## 2023-03-09 NOTE — TELEPHONE ENCOUNTER
Pt had not shown up for his follow up appt.  Called pt.  No answer.  Left voice message with contact information.

## 2023-03-13 ENCOUNTER — TELEPHONE (OUTPATIENT)
Dept: SMOKING CESSATION | Facility: CLINIC | Age: 58
End: 2023-03-13
Payer: COMMERCIAL

## 2023-03-13 NOTE — TELEPHONE ENCOUNTER
Attempted to contact pt regarding missed follow up appt.  Left voice message with contact information.  
coffee

## 2023-04-05 ENCOUNTER — TELEPHONE (OUTPATIENT)
Dept: SMOKING CESSATION | Facility: CLINIC | Age: 58
End: 2023-04-05
Payer: COMMERCIAL

## 2023-04-05 NOTE — TELEPHONE ENCOUNTER
Attempted to contact pt regarding missed follow up appt.  Left voice message with contact information.

## 2023-04-18 ENCOUNTER — OFFICE VISIT (OUTPATIENT)
Dept: FAMILY MEDICINE | Facility: CLINIC | Age: 58
End: 2023-04-18
Payer: COMMERCIAL

## 2023-04-18 VITALS
SYSTOLIC BLOOD PRESSURE: 128 MMHG | HEIGHT: 68 IN | OXYGEN SATURATION: 98 % | WEIGHT: 158.13 LBS | BODY MASS INDEX: 23.97 KG/M2 | TEMPERATURE: 98 F | DIASTOLIC BLOOD PRESSURE: 77 MMHG | RESPIRATION RATE: 20 BRPM | HEART RATE: 78 BPM

## 2023-04-18 DIAGNOSIS — I10 PRIMARY HYPERTENSION: ICD-10-CM

## 2023-04-18 DIAGNOSIS — Z86.010 HISTORY OF COLON POLYPS: ICD-10-CM

## 2023-04-18 DIAGNOSIS — R09.81 SINUS CONGESTION: ICD-10-CM

## 2023-04-18 DIAGNOSIS — Z80.0 FAMILY HISTORY OF COLON CANCER IN MOTHER: ICD-10-CM

## 2023-04-18 DIAGNOSIS — R09.81 NASAL CONGESTION: Primary | ICD-10-CM

## 2023-04-18 PROBLEM — Z86.0100 HISTORY OF COLON POLYPS: Status: ACTIVE | Noted: 2023-04-18

## 2023-04-18 PROCEDURE — 1159F MED LIST DOCD IN RCRD: CPT | Mod: CPTII,,, | Performed by: STUDENT IN AN ORGANIZED HEALTH CARE EDUCATION/TRAINING PROGRAM

## 2023-04-18 PROCEDURE — 3078F DIAST BP <80 MM HG: CPT | Mod: CPTII,,, | Performed by: STUDENT IN AN ORGANIZED HEALTH CARE EDUCATION/TRAINING PROGRAM

## 2023-04-18 PROCEDURE — 99213 PR OFFICE/OUTPT VISIT, EST, LEVL III, 20-29 MIN: ICD-10-PCS | Mod: S$PBB,,, | Performed by: STUDENT IN AN ORGANIZED HEALTH CARE EDUCATION/TRAINING PROGRAM

## 2023-04-18 PROCEDURE — 3008F PR BODY MASS INDEX (BMI) DOCUMENTED: ICD-10-PCS | Mod: CPTII,,, | Performed by: STUDENT IN AN ORGANIZED HEALTH CARE EDUCATION/TRAINING PROGRAM

## 2023-04-18 PROCEDURE — 99215 OFFICE O/P EST HI 40 MIN: CPT | Mod: PBBFAC,PN | Performed by: STUDENT IN AN ORGANIZED HEALTH CARE EDUCATION/TRAINING PROGRAM

## 2023-04-18 PROCEDURE — 1159F PR MEDICATION LIST DOCUMENTED IN MEDICAL RECORD: ICD-10-PCS | Mod: CPTII,,, | Performed by: STUDENT IN AN ORGANIZED HEALTH CARE EDUCATION/TRAINING PROGRAM

## 2023-04-18 PROCEDURE — 3008F BODY MASS INDEX DOCD: CPT | Mod: CPTII,,, | Performed by: STUDENT IN AN ORGANIZED HEALTH CARE EDUCATION/TRAINING PROGRAM

## 2023-04-18 PROCEDURE — 3074F SYST BP LT 130 MM HG: CPT | Mod: CPTII,,, | Performed by: STUDENT IN AN ORGANIZED HEALTH CARE EDUCATION/TRAINING PROGRAM

## 2023-04-18 PROCEDURE — 3078F PR MOST RECENT DIASTOLIC BLOOD PRESSURE < 80 MM HG: ICD-10-PCS | Mod: CPTII,,, | Performed by: STUDENT IN AN ORGANIZED HEALTH CARE EDUCATION/TRAINING PROGRAM

## 2023-04-18 PROCEDURE — 3074F PR MOST RECENT SYSTOLIC BLOOD PRESSURE < 130 MM HG: ICD-10-PCS | Mod: CPTII,,, | Performed by: STUDENT IN AN ORGANIZED HEALTH CARE EDUCATION/TRAINING PROGRAM

## 2023-04-18 PROCEDURE — 99213 OFFICE O/P EST LOW 20 MIN: CPT | Mod: S$PBB,,, | Performed by: STUDENT IN AN ORGANIZED HEALTH CARE EDUCATION/TRAINING PROGRAM

## 2023-04-18 RX ORDER — CETIRIZINE HYDROCHLORIDE 10 MG/1
10 TABLET ORAL DAILY
Qty: 90 TABLET | Refills: 3 | Status: SHIPPED | OUTPATIENT
Start: 2023-04-18 | End: 2024-04-17

## 2023-04-18 NOTE — PROGRESS NOTES
Subjective:       Patient ID: Harjit Handley is a 57 y.o. male.    Chief Complaint: Follow-up    Follow-up  Associated symptoms include congestion. Pertinent negatives include no change in bowel habit, chest pain, chills, fever, nausea, vomiting or weakness. 56-year-old male presents to clinic for follow-up    Patient's main issue today is nasal congestion with postnasal drip that is causing sinus headaches  States he is no longer on Zyrtec and would like to go back to Zyrtec  Also states he tried Flonase but it kept dripping down the back of his throat making him nauseous   Patient demonstrated straight head positioning while using Flonase    HTN  HCTZ-lsinopril last visit from amlodipine.  Blood pressure today 128/77    Tobacco use  -is now smoking 1 pack/day would like help with cessation  - Did sign commit to quit  -referring to smoking cessation    HLD  Patient with total cholesterol of 222 and elevated LDL  Crestor 10 mg Tolerating well    Elevated Creatinine  -Noted on lab work from first visit. Slightly decreased GFR  -    Depression/Insomnia  - occasional. Has been on medication in the past. Does not need medication at this time. NO SI Or HI  Stable on Trazodone 50 mg. States he takes half two to 3 days per week and whole on weekends.    Abdominal Pain/Constipation/History of 3 tubular adenomas (2017) descending colon  Started on Miralax-doing well  2017 history of 3 tubular adenomas.  Referred back to [facility]      Erectile dysfunction  Has trouble maintaining erections. No chest pain or other alarm symptoms. Viagra 50 mg tablets prescribed, patient states that this medication is working well for him and would like refills    HM  Shingles 9/21/2021 and 12/21/2021  Pneumo 23 9/21/2022  Flu- 9/21/2022  Dtap 9/21/2021  Influenza 10/30/2021  Colonoscopy 2017 with 3 tubular adenomas. Referral placed  [place] lung cancer screening-9/20/2021 BI-RADS 2;placed repeat this visit 10/26/2022    Review of Systems    Constitutional:  Negative for chills, fever and unexpected weight change.   HENT:  Positive for nasal congestion, postnasal drip and sinus pressure/congestion.    Respiratory:  Negative for shortness of breath and wheezing.    Cardiovascular:  Negative for chest pain and palpitations.   Gastrointestinal:  Positive for reflux. Negative for change in bowel habit, diarrhea, nausea, vomiting and change in bowel habit.   Genitourinary:  Positive for erectile dysfunction. Negative for dysuria, frequency and urgency.   Neurological:  Negative for syncope, weakness, coordination difficulties and coordination difficulties.   Psychiatric/Behavioral:  Negative for dysphoric mood.        Objective:      Physical Exam  Constitutional:       General: He is not in acute distress.  Eyes:      General: No scleral icterus.     Extraocular Movements: Extraocular movements intact.      Conjunctiva/sclera: Conjunctivae normal.      Pupils: Pupils are equal, round, and reactive to light.   Cardiovascular:      Rate and Rhythm: Normal rate and regular rhythm.      Heart sounds: No murmur heard.  Pulmonary:      Effort: Pulmonary effort is normal. No respiratory distress.      Breath sounds: No stridor. No wheezing or rhonchi.   Abdominal:      General: Bowel sounds are normal. There is no distension.      Palpations: Abdomen is soft.      Tenderness: There is no abdominal tenderness. There is no guarding.   Musculoskeletal:         General: No swelling. Normal range of motion.   Skin:     General: Skin is warm and dry.      Coloration: Skin is not jaundiced.      Findings: No rash.   Neurological:      Mental Status: He is alert.      Gait: Gait normal.   Psychiatric:         Thought Content: Thought content normal.       Assessment:       Problem List Items Addressed This Visit          ENT    Sinus congestion       Cardiac/Vascular    Primary hypertension       GI    History of colon polyps    Relevant Orders    Ambulatory  referral/consult to Gastroenterology     Other Visit Diagnoses       Nasal congestion    -  Primary    Relevant Medications    cetirizine (ZYRTEC) 10 MG tablet    Other Relevant Orders    Ambulatory referral/consult to ENT    Family history of colon cancer in mother        Relevant Orders    Ambulatory referral/consult to Gastroenterology              Plan:       Problem List Items Addressed This Visit          ENT    Sinus congestion    Overview     Referring patient to ENT   Demonstrated how to correctly use Flonase and adding Zyrtec              Cardiac/Vascular    Primary hypertension    Overview       Stable; Continue  HCtZ qeacxorb60.5/50              GI    History of colon polyps    Overview     Referring patient to Dr. Guillen           Relevant Orders    Ambulatory referral/consult to Gastroenterology     Other Visit Diagnoses       Nasal congestion    -  Primary    Relevant Medications    cetirizine (ZYRTEC) 10 MG tablet    Other Relevant Orders    Ambulatory referral/consult to ENT    Family history of colon cancer in mother        Relevant Orders    Ambulatory referral/consult to Gastroenterology           Problem List Items Addressed This Visit          ENT    Sinus congestion    Overview     Referring patient to ENT   Demonstrated how to correctly use Flonase and adding Zyrtec              Cardiac/Vascular    Primary hypertension    Overview       Stable; Continue  HCtZ hieipzso37.5/50              GI    History of colon polyps    Overview     Referring patient to Dr. Guillen           Relevant Orders    Ambulatory referral/consult to Gastroenterology     Other Visit Diagnoses       Nasal congestion    -  Primary    Relevant Medications    cetirizine (ZYRTEC) 10 MG tablet    Other Relevant Orders    Ambulatory referral/consult to ENT    Family history of colon cancer in mother        Relevant Orders    Ambulatory referral/consult to Gastroenterology         Follow up in about 3 months (around 7/18/2023)  for Sinus congestion  HTN.

## 2023-05-25 ENCOUNTER — TELEPHONE (OUTPATIENT)
Dept: SMOKING CESSATION | Facility: CLINIC | Age: 58
End: 2023-05-25
Payer: COMMERCIAL

## 2023-05-30 ENCOUNTER — TELEPHONE (OUTPATIENT)
Dept: SMOKING CESSATION | Facility: CLINIC | Age: 58
End: 2023-05-30
Payer: COMMERCIAL

## 2023-05-31 NOTE — PROGRESS NOTES
"[facility]  Otolaryngology Clinic Note    Harjit Handley  YOB: 1965    Chief Complaint: nasal congestion    HPI: 05/31/2023: 57yoM referred for nasal congestion L>R. This has been ongoing for years. Occasional rhintis & PND. Recently placed back on zyrtec by PCP. He states it helps a little. He uses astelin prn "when it gets bad."  He has had difficulty tolerating nasal sprays in the past, stating they make him nauseated & cause his GERD to flare. Occasional pressure periorbitally and to nose. Endorses hyposmia and itchy eyes. Denies sneezing. Occasionally sees a little bleeding from his nose, usually on the left. Reports that he works around a lot of chemicals. Wife states he often mouth breathes and has begun snoring over the past year. He wakes frequently at night but denies apnea or EDS. Does not often take abx or have sinus infections. MRI 9/10/21 with ITH. Smoker of 1-2ppd for many years. States he was weaning back while in the cessation program but was unable to take off work to keep up with visits.     ROS:   10-point review of systems negative except per HPI      Review of patient's allergies indicates:  No Known Allergies    Past Medical History:   Diagnosis Date    GERD (gastroesophageal reflux disease)     Hyperlipidemia     Hypertension     Seizures        Past Surgical History:   Procedure Laterality Date    COLONOSCOPY      HERNIA REPAIR         Social History     Socioeconomic History    Marital status:     Number of children: 10   Tobacco Use    Smoking status: Every Day     Packs/day: 1.50     Years: 30.00     Pack years: 45.00     Types: Cigarettes     Passive exposure: Never    Smokeless tobacco: Never    Tobacco comments:     Ready to stop smoking   Substance and Sexual Activity    Alcohol use: Not Currently    Drug use: Never       Family History   Problem Relation Age of Onset    Cancer Mother     Cancer Father        Outpatient Encounter Medications " as of 6/1/2023   Medication Sig Dispense Refill    cetirizine (ZYRTEC) 10 MG tablet Take 1 tablet (10 mg total) by mouth once daily. 90 tablet 3    losartan-hydrochlorothiazide 50-12.5 mg (HYZAAR) 50-12.5 mg per tablet Take 1 tablet by mouth once daily. 90 tablet 3    nicotine (NICODERM CQ) 21 mg/24 hr Place 1 patch onto the skin once daily. 28 patch 0    nicotine, polacrilex, (NICORETTE) 2 mg Gum Chew 1 piece (2 mg total) by mouth as needed (Do not exceed more than 10 pieces in 24 hours). 100 each 0    pantoprazole (PROTONIX) 40 MG tablet Take 1 tablet (40 mg total) by mouth once daily. 30 tablet 11    polyethylene glycol (GLYCOLAX) 17 gram/dose powder Take 17 g by mouth once daily. 595 g 11    rosuvastatin (CRESTOR) 10 MG tablet Take 1 tablet (10 mg total) by mouth once daily. 30 tablet 11    sildenafiL (VIAGRA) 50 MG tablet Take 1 tablet (50 mg total) by mouth daily as needed for Erectile Dysfunction. 30 tablet 11    traZODone (DESYREL) 50 MG tablet Take 1 tablet (50 mg total) by mouth every evening. 30 tablet 11    varenicline (CHANTIX) 1 mg Tab Take 1/2 tablet once a day for 3 days, then increase to 1/2 tablet twice a day for 4 days. Beginning on Day 8, take 1 tablet twice daily until complete (Patient not taking: Reported on 4/18/2023) 56 tablet 0     No facility-administered encounter medications on file as of 6/1/2023.       Physical Exam:  There were no vitals filed for this visit.    General: NAD, voice normal  Neuro: AAO, CN II - XII grossly intact  Head/ Face: NCAT, symmetric, sensations intact bilaterally  Eyes: EOMI, PERRL  Ears: externally normal with grossly normal hearing  AD: EAC patent, TM intact, no middle ear effusion, no retractions  AS: EAC patent, TM intact, no middle ear effusion, no retractions  Nose: bilateral nares patent, midline septum, no rhinorrhea, no external deformity, ITH L>R, dry MM without active sites of bleeding on anterior rhinoscopy  OC/OP: MMM, no intraoral lesions, no  trismus, dentition is good, no uvular deviation, bilaterally symmetric soft palate elevation, palatoglossus and palatopharyngeal fold wnl; tonsils are symmetric and 1+  Indirect laryngoscopy: deferred due to patient intolerance  Neck: soft, supple, no LAD, normal ROM, no thyromegaly  Respiratory: nonlabored, no wheezing, bilateral chest rise  Cardiovascular: RRR  Gastrointestinal: S NT ND  Skin: warm, no lesions  Musculoskeletal: 5/5 strength  Psych: Appropriate affect/mood     Pertinent Data:  ? LABS:  ? AUDIO:           ? PATH:      Imaging:   I personally reviewed the following images:  Narrative & Impression     CLINICAL: Seizure, new onset.     TECHNIQUE: Multiplanar MRI sequences were performed of the brain  without contrast.     COMPARISON: None available.     FINDINGS: Chronic microangiopathic ischemic changes are minimal with  subtle periventricular and few punctate deep white matter T2 FLAIR  hyperintense signals. No other foci of abnormal increased or decreased  signal intensity throughout the cerebral hemispheres, cerebellum,  basal ganglia or brainstem. There is mild cerebral cortical volume  loss. Gradient echo sequences demonstrate no evidence of de phasing  artifact to suggest hemorrhagic byproducts.  No evidence of diffusion  restriction or ADC map signal drop out to suggest acute infarct.  The  sella and suprasellar areas are unremarkable.      The cerebellar tonsils are normally positioned. There is no acute  intracranial hemorrhage, hydrocephalus, midline shift or mass effect.   No acute extra axial fluid collections identified. The mastoid air  cells are clear.       IMPRESSION:     1. No acute intracranial findings identified.  2. Minimal chronic microangiopathic ischemia and mild atrophy.      Electronically Signed By: Raman Pittman MD  Date/Time Signed: 09/10/2021 13:43      Assessment/Plan:  57 y.o. male with AR, nasal congestion, nasal dryness with occasional epistaxis, GERD, tobacco  abuse. Reviewed MRI 2021- turbinate hypertrophy.  - NSI daily  - Flonase + astelin BID (reviewed correct technique)  - Stop zyrtec if mucosa becomes too dry  - Saline gel or aquaphor to nares 2-3x/day  - Humidifier at bedside  - GERD lifestyle modifications, continue PPI  - Encouraged smoking cessation  - Nicotine patches sent to pharmacy  - RTC 2mo      Melissa Singh NP

## 2023-06-01 ENCOUNTER — OFFICE VISIT (OUTPATIENT)
Dept: OTOLARYNGOLOGY | Facility: CLINIC | Age: 58
End: 2023-06-01
Payer: COMMERCIAL

## 2023-06-01 VITALS
WEIGHT: 160.38 LBS | TEMPERATURE: 99 F | SYSTOLIC BLOOD PRESSURE: 142 MMHG | HEART RATE: 66 BPM | HEIGHT: 68 IN | DIASTOLIC BLOOD PRESSURE: 78 MMHG | BODY MASS INDEX: 24.31 KG/M2

## 2023-06-01 DIAGNOSIS — K21.9 GASTROESOPHAGEAL REFLUX DISEASE, UNSPECIFIED WHETHER ESOPHAGITIS PRESENT: ICD-10-CM

## 2023-06-01 DIAGNOSIS — J34.89 NASAL DRYNESS: ICD-10-CM

## 2023-06-01 DIAGNOSIS — R09.81 NASAL CONGESTION: ICD-10-CM

## 2023-06-01 DIAGNOSIS — J30.9 ALLERGIC RHINITIS, UNSPECIFIED SEASONALITY, UNSPECIFIED TRIGGER: Primary | ICD-10-CM

## 2023-06-01 DIAGNOSIS — R04.0 EPISTAXIS: ICD-10-CM

## 2023-06-01 DIAGNOSIS — F17.210 CIGARETTE NICOTINE DEPENDENCE WITHOUT COMPLICATION: ICD-10-CM

## 2023-06-01 PROCEDURE — 3008F PR BODY MASS INDEX (BMI) DOCUMENTED: ICD-10-PCS | Mod: CPTII,,, | Performed by: NURSE PRACTITIONER

## 2023-06-01 PROCEDURE — 99204 OFFICE O/P NEW MOD 45 MIN: CPT | Mod: S$PBB,,, | Performed by: NURSE PRACTITIONER

## 2023-06-01 PROCEDURE — 99204 PR OFFICE/OUTPT VISIT, NEW, LEVL IV, 45-59 MIN: ICD-10-PCS | Mod: S$PBB,,, | Performed by: NURSE PRACTITIONER

## 2023-06-01 PROCEDURE — 3077F PR MOST RECENT SYSTOLIC BLOOD PRESSURE >= 140 MM HG: ICD-10-PCS | Mod: CPTII,,, | Performed by: NURSE PRACTITIONER

## 2023-06-01 PROCEDURE — 3008F BODY MASS INDEX DOCD: CPT | Mod: CPTII,,, | Performed by: NURSE PRACTITIONER

## 2023-06-01 PROCEDURE — 1159F MED LIST DOCD IN RCRD: CPT | Mod: CPTII,,, | Performed by: NURSE PRACTITIONER

## 2023-06-01 PROCEDURE — 99214 OFFICE O/P EST MOD 30 MIN: CPT | Mod: PBBFAC | Performed by: NURSE PRACTITIONER

## 2023-06-01 PROCEDURE — 3078F PR MOST RECENT DIASTOLIC BLOOD PRESSURE < 80 MM HG: ICD-10-PCS | Mod: CPTII,,, | Performed by: NURSE PRACTITIONER

## 2023-06-01 PROCEDURE — 3077F SYST BP >= 140 MM HG: CPT | Mod: CPTII,,, | Performed by: NURSE PRACTITIONER

## 2023-06-01 PROCEDURE — 1159F PR MEDICATION LIST DOCUMENTED IN MEDICAL RECORD: ICD-10-PCS | Mod: CPTII,,, | Performed by: NURSE PRACTITIONER

## 2023-06-01 PROCEDURE — 3078F DIAST BP <80 MM HG: CPT | Mod: CPTII,,, | Performed by: NURSE PRACTITIONER

## 2023-06-01 RX ORDER — FLUTICASONE PROPIONATE 50 MCG
2 SPRAY, SUSPENSION (ML) NASAL 2 TIMES DAILY
Qty: 16 G | Refills: 11 | Status: SHIPPED | OUTPATIENT
Start: 2023-06-01 | End: 2023-07-01

## 2023-06-01 RX ORDER — IBUPROFEN 200 MG
1 TABLET ORAL DAILY
Qty: 28 PATCH | Refills: 0 | Status: SHIPPED | OUTPATIENT
Start: 2023-06-01 | End: 2023-06-30

## 2023-06-01 RX ORDER — AZELASTINE 1 MG/ML
1 SPRAY, METERED NASAL 2 TIMES DAILY
Qty: 30 ML | Refills: 5 | Status: SHIPPED | OUTPATIENT
Start: 2023-06-01 | End: 2024-05-31

## 2023-06-02 ENCOUNTER — PATIENT MESSAGE (OUTPATIENT)
Dept: FAMILY MEDICINE | Facility: CLINIC | Age: 58
End: 2023-06-02
Payer: COMMERCIAL

## 2023-06-05 DIAGNOSIS — Z86.010 HISTORY OF COLON POLYPS: Primary | ICD-10-CM

## 2023-06-30 DIAGNOSIS — F17.210 CIGARETTE NICOTINE DEPENDENCE WITHOUT COMPLICATION: ICD-10-CM

## 2023-06-30 RX ORDER — IBUPROFEN 200 MG
TABLET ORAL
Qty: 28 PATCH | Refills: 0 | Status: SHIPPED | OUTPATIENT
Start: 2023-06-30 | End: 2023-08-18

## 2023-07-17 ENCOUNTER — TELEPHONE (OUTPATIENT)
Dept: FAMILY MEDICINE | Facility: CLINIC | Age: 58
End: 2023-07-17
Payer: COMMERCIAL

## 2023-08-01 DIAGNOSIS — E78.5 HYPERLIPIDEMIA, UNSPECIFIED HYPERLIPIDEMIA TYPE: ICD-10-CM

## 2023-08-01 DIAGNOSIS — I10 HYPERTENSION, UNSPECIFIED TYPE: ICD-10-CM

## 2023-08-01 RX ORDER — ROSUVASTATIN CALCIUM 10 MG/1
10 TABLET, COATED ORAL
Qty: 90 TABLET | Refills: 0 | Status: SHIPPED | OUTPATIENT
Start: 2023-08-01 | End: 2023-11-27

## 2023-08-01 NOTE — TELEPHONE ENCOUNTER
Care Due:                  Date            Visit Type   Department     Provider  --------------------------------------------------------------------------------                                EP -                              PRIMARY      [facility]  Last Visit: 04-      CARE ([facility])   MEDICINE       Regina Joseph                              ESTABLISHED                              PATIENT -    [facility]  Next Visit: 08-      VIRTUAL      MEDICINE       Regina Joseph                                                            Last  Test          Frequency    Reason                     Performed    Due Date  --------------------------------------------------------------------------------    CMP.........  12 months..  losartan-hydrochlorothiaz  10-   10-                             obdulio, rosuvastatin........    Lipid Panel.  12 months..  rosuvastatin.............  09- 09-    [facility] Embedded Care Due Messages. Reference number: [id number].   8/01/2023 5:19:27 PM CDT

## 2023-08-02 RX ORDER — LOSARTAN POTASSIUM AND HYDROCHLOROTHIAZIDE 12.5; 5 MG/1; MG/1
1 TABLET ORAL
Qty: 90 TABLET | Refills: 3 | Status: SHIPPED | OUTPATIENT
Start: 2023-08-02 | End: 2023-12-01 | Stop reason: SDUPTHER

## 2023-08-02 NOTE — TELEPHONE ENCOUNTER
Refill Routing Note   Medication(s) are not appropriate for processing by [facility] for the following reason(s):      Required vitals abnormal    [facility] action(s):  Defer  Approve Care Due:  Labs due            Appointments  past 12m or future 3m with PCP    Date Provider   Last Visit   4/18/2023 Regina Joseph MD   Next Visit   8/18/2023 Regina Joseph MD   ED visits in past 90 days: 0        Note composed:9:55 PM 08/01/2023

## 2023-08-17 ENCOUNTER — PATIENT MESSAGE (OUTPATIENT)
Dept: ADMINISTRATIVE | Facility: HOSPITAL | Age: 58
End: 2023-08-17
Payer: COMMERCIAL

## 2023-08-18 ENCOUNTER — OFFICE VISIT (OUTPATIENT)
Dept: FAMILY MEDICINE | Facility: CLINIC | Age: 58
End: 2023-08-18
Payer: COMMERCIAL

## 2023-08-18 DIAGNOSIS — F17.200 CURRENT SMOKER: ICD-10-CM

## 2023-08-18 DIAGNOSIS — I10 PRIMARY HYPERTENSION: Primary | ICD-10-CM

## 2023-08-18 PROCEDURE — 99213 PR OFFICE/OUTPT VISIT, EST, LEVL III, 20-29 MIN: ICD-10-PCS | Mod: 95,,, | Performed by: STUDENT IN AN ORGANIZED HEALTH CARE EDUCATION/TRAINING PROGRAM

## 2023-08-18 PROCEDURE — 99213 OFFICE O/P EST LOW 20 MIN: CPT | Mod: 95,,, | Performed by: STUDENT IN AN ORGANIZED HEALTH CARE EDUCATION/TRAINING PROGRAM

## 2023-08-18 NOTE — ASSESSMENT & PLAN NOTE
Episode of hypotension may have been secondary to heat with heavy sweating   Instructed patient to take blood pressure when he gets home from work every day next week and follow office with results or use patient portal to contact office. May need to decrease losartan hydrochlorothiazide

## 2023-08-18 NOTE — PROGRESS NOTES
Audio Only Telehealth Visit     The patient location is: home  The chief complaint leading to consultation is: bp issues   Visit type: Virtual visit with audio only (telephone)  Total time spent with patient: 15 minutes      The reason for the audio only service rather than synchronous audio and video virtual visit was related to technical difficulties or patient preference/necessity.     Each patient to whom I provide medical services by telemedicine is:  (1) informed of the relationship between the physician and patient and the respective role of any other health care provider with respect to management of the patient; and (2) notified that they may decline to receive medical services by telemedicine and may withdraw from such care at any time. Patient verbally consented to receive this service via voice-only telephone call.    Patient Name: Harjit Handley   : 1965  MRN: [id number]     Subjective:   Patient ID: Harjit Handley is a 57 y.o. male.    Chief Complaint: No chief complaint on file.       HPI: HPI  Patient is established with ENT and states that his sinuses    Main issue today is it has been hot were patient has been working outside and he took his blood pressure 1 day because he did not feel well when he came home and noted that it was 103/66  No other issues of feeling dizzy therefore did not take blood pressure   HCTZ-lsinopril 50-12.5     Tobacco use  -is now smoking 1 pack/day would like help with cessation  - Did sign commit to quit  -referring to smoking cessation    HLD  Patient with total cholesterol of 222 and elevated LDL  Crestor 10 mg Tolerating well    Elevated Creatinine  -Noted on lab work from first visit. Slightly decreased GFR  -    Depression/Insomnia  - occasional. Has been on medication in the past. Does not need medication at this time. NO SI Or HI  Stable on Trazodone 50 mg. States he takes half two to 3 days per week and whole on weekends.    Abdominal  Pain/Constipation/History of 3 tubular adenomas (2017) descending colon  Started on Miralax-doing well  2017 history of 3 tubular adenomas.  Referred back to [facility]      Erectile dysfunction  Has trouble maintaining erections. No chest pain or other alarm symptoms. Viagra 50 mg tablets prescribed, patient states that this medication is working well for him and would like refills    HM  Shingles 9/21/2021 and 12/21/2021  Pneumo 23 9/21/2022  Flu- 9/21/2022  Dtap 9/21/2021  Influenza 10/30/2021  Colonoscopy 2017 with 3 tubular adenomas. Referral placed  [place] lung cancer screening-9/20/2021 BI-RADS 2;placed repeat this visit 10/26/    ROS:  Review of Systems   HENT:  Negative for hearing loss.    Eyes:  Negative for discharge.   Respiratory:  Negative for wheezing.    Cardiovascular:  Negative for chest pain and palpitations.   Gastrointestinal:  Negative for blood in stool, constipation, diarrhea and vomiting.   Genitourinary:  Negative for hematuria and urgency.   Musculoskeletal:  Negative for neck pain.   Neurological:  Negative for weakness and headaches.   Endo/Heme/Allergies:  Negative for polydipsia.      History:     Past Medical History:   Diagnosis Date    GERD (gastroesophageal reflux disease)     Hyperlipidemia     Hypertension     Seasonal allergies     Not sure    Seizures       Past Surgical History:   Procedure Laterality Date    COLONOSCOPY      HERNIA REPAIR       Family History   Problem Relation Age of Onset    Cancer Mother         Colon cancer    Diabetes Mother     Hypertension Mother     Cancer Father     Diabetes Maternal Grandmother     Stroke Maternal Grandmother       Social History     Tobacco Use    Smoking status: Every Day     Current packs/day: 1.50     Average packs/day: 1.5 packs/day for 15.0 years (22.5 ttl pk-yrs)     Types: Cigarettes     Passive exposure: Never    Smokeless tobacco: Never   Substance and Sexual Activity    Alcohol use: Yes     Alcohol/week: 12.0 standard  drinks of alcohol     Types: 12 Cans of beer per week     Comment: Beer Drinker mostly    Drug use: Never    Sexual activity: Not Currently     Partners: Female     Birth control/protection: None        Allergies: Review of patient's allergies indicates:  No Known Allergies  Objective:   There were no vitals filed for this visit.  There is no height or weight on file to calculate BMI.     Physical Examination:   Physical Exam  Patient appears in no acute distress and is able to talk at a conversational tone  Assessment:     Problem List Items Addressed This Visit          Cardiac/Vascular    Primary hypertension - Primary    Overview                Current Assessment & Plan     Episode of hypotension may have been secondary to heat with heavy sweating   Instructed patient to take blood pressure when he gets home from work every day next week and follow office with results or use patient portal to contact office. May need to decrease losartan hydrochlorothiazide          Other Visit Diagnoses       Current smoker        Relevant Orders    CT Chest Lung Screening Low Dose            Plan:   Diagnoses and all orders for this visit:    Primary hypertension    Current smoker  -     CT Chest Lung Screening Low Dose; Future       Problem List Items Addressed This Visit          Cardiac/Vascular    Primary hypertension - Primary    Overview                Current Assessment & Plan     Episode of hypotension may have been secondary to heat with heavy sweating   Instructed patient to take blood pressure when he gets home from work every day next week and follow office with results or use patient portal to contact office. May need to decrease losartan hydrochlorothiazide          Other Visit Diagnoses       Current smoker        Relevant Orders    CT Chest Lung Screening Low Dose           Follow up in about 6 weeks (around 9/29/2023) for Hypertension, BP check needs labs.       This note was created with the assistance of humza  voice recognition software or phone dictation. There may be transcription errors as a result of using this technology however minimal. Effort has been made to assure accuracy of transcription but any obvious errors or omissions should be clarified with the author of the document      This service was not originating from a related E/M service provided within the previous 7 days nor will  to an E/M service or procedure within the next 24 hours or my soonest available appointment.  Prevailing standard of care was able to be met in this audio-only visit.

## 2023-08-24 ENCOUNTER — PATIENT OUTREACH (OUTPATIENT)
Dept: ADMINISTRATIVE | Facility: HOSPITAL | Age: 58
End: 2023-08-24
Payer: COMMERCIAL

## 2023-08-24 NOTE — PROGRESS NOTES
Population Health Outreach.  Campaign MessageFollow up:     Cologuard kit October 2022-not completed.     Referred to Dr. Guillen 4/18/23 and patient requested Dr. Harper.   Referral sent to Dr. Harper on 6/5/23. Per Dr. Harper's office they made multiple attempts to schedule patient without success.     Encouraged patient to call Dr. Harper's office back to schedule colonoscopy.     ----- Message -----       From:Charlotte Flores (proxy for Harjit Campbelliver)       Sent:8/24/2023 10:55 AM CDT         To:Campaign Outreach Message Message List    Subject:Preventative Care Screenings    The mail in test was completed and mail back out.   Contact was made with Dr. Harper this month I'm person, waiting for doctor to review records and schedule the appointment.   The previous Gastro doctor referral was in [place] and I live in [place] so that first referral did not work for me.   I am comfortable with doctor Harper so I am just waiting for an appointment. However if I don't hear back by the 1st I will follow up myself because of my family history of colon cancer.

## 2023-08-29 ENCOUNTER — PATIENT MESSAGE (OUTPATIENT)
Dept: FAMILY MEDICINE | Facility: CLINIC | Age: 58
End: 2023-08-29
Payer: COMMERCIAL

## 2023-09-06 ENCOUNTER — HOSPITAL ENCOUNTER (OUTPATIENT)
Dept: RADIOLOGY | Facility: HOSPITAL | Age: 58
Discharge: HOME | End: 2023-09-06
Attending: STUDENT IN AN ORGANIZED HEALTH CARE EDUCATION/TRAINING PROGRAM
Payer: COMMERCIAL

## 2023-09-06 DIAGNOSIS — F17.200 CURRENT SMOKER: ICD-10-CM

## 2023-09-06 PROCEDURE — 71271 CT THORAX LUNG CANCER SCR C-: CPT | Mod: TC

## 2023-09-07 ENCOUNTER — PATIENT MESSAGE (OUTPATIENT)
Dept: FAMILY MEDICINE | Facility: CLINIC | Age: 58
End: 2023-09-07
Payer: COMMERCIAL

## 2023-09-28 ENCOUNTER — PATIENT MESSAGE (OUTPATIENT)
Dept: FAMILY MEDICINE | Facility: CLINIC | Age: 58
End: 2023-09-28
Payer: COMMERCIAL

## 2023-09-29 ENCOUNTER — OFFICE VISIT (OUTPATIENT)
Dept: FAMILY MEDICINE | Facility: CLINIC | Age: 58
End: 2023-09-29
Payer: COMMERCIAL

## 2023-09-29 VITALS
RESPIRATION RATE: 20 BRPM | WEIGHT: 167.5 LBS | HEART RATE: 80 BPM | OXYGEN SATURATION: 99 % | DIASTOLIC BLOOD PRESSURE: 79 MMHG | SYSTOLIC BLOOD PRESSURE: 134 MMHG | HEIGHT: 68 IN | BODY MASS INDEX: 25.39 KG/M2 | TEMPERATURE: 98 F

## 2023-09-29 DIAGNOSIS — F17.200 SMOKER: ICD-10-CM

## 2023-09-29 DIAGNOSIS — F32.A DEPRESSION, UNSPECIFIED DEPRESSION TYPE: ICD-10-CM

## 2023-09-29 DIAGNOSIS — Z12.5 PROSTATE CANCER SCREENING: ICD-10-CM

## 2023-09-29 DIAGNOSIS — Z00.00 WELLNESS EXAMINATION: ICD-10-CM

## 2023-09-29 DIAGNOSIS — I10 PRIMARY HYPERTENSION: ICD-10-CM

## 2023-09-29 DIAGNOSIS — R06.02 SOB (SHORTNESS OF BREATH): ICD-10-CM

## 2023-09-29 DIAGNOSIS — E78.49 OTHER HYPERLIPIDEMIA: ICD-10-CM

## 2023-09-29 DIAGNOSIS — R56.9 CONVULSIONS, UNSPECIFIED CONVULSION TYPE: ICD-10-CM

## 2023-09-29 DIAGNOSIS — F51.01 PRIMARY INSOMNIA: ICD-10-CM

## 2023-09-29 DIAGNOSIS — Z23 FLU VACCINE NEED: Primary | ICD-10-CM

## 2023-09-29 DIAGNOSIS — G47.00 INSOMNIA, UNSPECIFIED TYPE: ICD-10-CM

## 2023-09-29 DIAGNOSIS — J44.9 CHRONIC OBSTRUCTIVE PULMONARY DISEASE, UNSPECIFIED COPD TYPE: ICD-10-CM

## 2023-09-29 PROCEDURE — 3008F PR BODY MASS INDEX (BMI) DOCUMENTED: ICD-10-PCS | Mod: CPTII,,, | Performed by: STUDENT IN AN ORGANIZED HEALTH CARE EDUCATION/TRAINING PROGRAM

## 2023-09-29 PROCEDURE — 3075F SYST BP GE 130 - 139MM HG: CPT | Mod: CPTII,,, | Performed by: STUDENT IN AN ORGANIZED HEALTH CARE EDUCATION/TRAINING PROGRAM

## 2023-09-29 PROCEDURE — 99396 PREV VISIT EST AGE 40-64: CPT | Mod: S$PBB,25,, | Performed by: STUDENT IN AN ORGANIZED HEALTH CARE EDUCATION/TRAINING PROGRAM

## 2023-09-29 PROCEDURE — 3078F PR MOST RECENT DIASTOLIC BLOOD PRESSURE < 80 MM HG: ICD-10-PCS | Mod: CPTII,,, | Performed by: STUDENT IN AN ORGANIZED HEALTH CARE EDUCATION/TRAINING PROGRAM

## 2023-09-29 PROCEDURE — 3078F DIAST BP <80 MM HG: CPT | Mod: CPTII,,, | Performed by: STUDENT IN AN ORGANIZED HEALTH CARE EDUCATION/TRAINING PROGRAM

## 2023-09-29 PROCEDURE — 99214 OFFICE O/P EST MOD 30 MIN: CPT | Mod: PBBFAC,PN | Performed by: STUDENT IN AN ORGANIZED HEALTH CARE EDUCATION/TRAINING PROGRAM

## 2023-09-29 PROCEDURE — 3008F BODY MASS INDEX DOCD: CPT | Mod: CPTII,,, | Performed by: STUDENT IN AN ORGANIZED HEALTH CARE EDUCATION/TRAINING PROGRAM

## 2023-09-29 PROCEDURE — 1159F MED LIST DOCD IN RCRD: CPT | Mod: CPTII,,, | Performed by: STUDENT IN AN ORGANIZED HEALTH CARE EDUCATION/TRAINING PROGRAM

## 2023-09-29 PROCEDURE — 90471 IMMUNIZATION ADMIN: CPT | Mod: PBBFAC,PN

## 2023-09-29 PROCEDURE — 99406 BEHAV CHNG SMOKING 3-10 MIN: CPT | Mod: S$PBB,,, | Performed by: STUDENT IN AN ORGANIZED HEALTH CARE EDUCATION/TRAINING PROGRAM

## 2023-09-29 PROCEDURE — 3075F PR MOST RECENT SYSTOLIC BLOOD PRESS GE 130-139MM HG: ICD-10-PCS | Mod: CPTII,,, | Performed by: STUDENT IN AN ORGANIZED HEALTH CARE EDUCATION/TRAINING PROGRAM

## 2023-09-29 PROCEDURE — 90686 IIV4 VACC NO PRSV 0.5 ML IM: CPT | Mod: PBBFAC,PN

## 2023-09-29 PROCEDURE — 99396 PR PREVENTIVE VISIT,EST,40-64: ICD-10-PCS | Mod: S$PBB,25,, | Performed by: STUDENT IN AN ORGANIZED HEALTH CARE EDUCATION/TRAINING PROGRAM

## 2023-09-29 PROCEDURE — 99406 PR TOBACCO USE CESSATION INTERMEDIATE 3-10 MINUTES: ICD-10-PCS | Mod: S$PBB,,, | Performed by: STUDENT IN AN ORGANIZED HEALTH CARE EDUCATION/TRAINING PROGRAM

## 2023-09-29 PROCEDURE — 1159F PR MEDICATION LIST DOCUMENTED IN MEDICAL RECORD: ICD-10-PCS | Mod: CPTII,,, | Performed by: STUDENT IN AN ORGANIZED HEALTH CARE EDUCATION/TRAINING PROGRAM

## 2023-09-29 RX ORDER — ALBUTEROL SULFATE 90 UG/1
2 AEROSOL, METERED RESPIRATORY (INHALATION) EVERY 6 HOURS PRN
Qty: 18 G | Refills: 0 | Status: SHIPPED | OUTPATIENT
Start: 2023-09-29 | End: 2024-09-28

## 2023-09-29 RX ORDER — TRAZODONE HYDROCHLORIDE 50 MG/1
50 TABLET ORAL NIGHTLY
Qty: 30 TABLET | Refills: 11 | Status: SHIPPED | OUTPATIENT
Start: 2023-09-29 | End: 2024-09-28

## 2023-09-29 RX ORDER — DM/P-EPHED/ACETAMINOPH/DOXYLAM 30-7.5/3
2 LIQUID (ML) ORAL
Qty: 108 LOZENGE | Refills: 11 | Status: SHIPPED | OUTPATIENT
Start: 2023-09-29

## 2023-09-29 RX ADMIN — INFLUENZA VIRUS VACCINE 0.5 ML: 15; 15; 15; 15 SUSPENSION INTRAMUSCULAR at 09:09

## 2023-09-29 NOTE — ASSESSMENT & PLAN NOTE
Reviewed blood pressure readings with patient   Will continue hydrochlorothiazide losartan 12.5/50

## 2023-09-29 NOTE — ASSESSMENT & PLAN NOTE
Spent 3 minutes discussing smoking cessation with patient as he does have COPD and this is 1 of the ways to decrease the progression of lung damage new line

## 2023-09-29 NOTE — PROGRESS NOTES
Patient Name: Harjit Handley     : 1965    MRN: [id number]     Subjective:     Patient ID: Harjit Handley is a 58 y.o. male.    Chief Complaint:   Chief Complaint   Patient presents with    Follow-up     C/o HTN, B/p Check,  Needs labs        HPI: HPI        58-year-old male returns to clinic for follow-up of blood pressure   Patient had a lower blood pressure reading when he came to previous appointment however it had been very hot outside and patient had been sweating profusely and noted to be hypotensive  Patient has been tracking his blood pressure and blood pressure today is 134/79 patient presents with log showing most readings are in range he did have to outline hours after he smoked is currently taking   HCTZ-lsinopril 50-12.5     Tobacco use  -is now smoking 1 pack/day w- Did sign commit to quit  -referring to smoking cessation  States that he thinks the patches are making him want to smoke more  Does welcome lozenges    HLD  Patient with total cholesterol of 222 and elevated LDL  Crestor 10 mg Tolerating well    Elevated Creatinine  -Noted on lab work from first visit. Slightly decreased GFR      Shortness of breath   Patient is able to states he notices at times he is wheezing   Would like fill of albuterol CT indicates patient has a component of COPD    Depression/Insomnia  - occasional. Has been on medication in the past. Does not need medication at this time. NO SI Or HI  Stable on Trazodone 50 mg. States he takes half two to 3 days per week and whole on weekends.    Abdominal Pain/Constipation/History of 3 tubular adenomas (2017) descending colon  Started on Miralax-doing well  2017 history of 3 tubular adenomas.  Referred back to [facility]      Erectile dysfunction  Has trouble maintaining erections. No chest pain or other alarm symptoms. Viagra 50 mg tablets prescribed, patient states that this medication is working well for him and would like refills      Trav 2021 and  12/21/2021  Pneumo 23 9/21/2022  Flu- 9/21/2022  Dtap 9/21/2021  Influenza 10/30/2021  Colonoscopy 2017 with 3 tubular adenomas. Referral placed  [place] lung cancer screening-9/20/2021 BI-RADS 2;placed repeat this visit 10/26/    ROS:  ROS:      ROS     12 point review of systems conducted, negative except as stated in the history of present illness. See HPI for details.    History:     Past Medical History:   Diagnosis Date    GERD (gastroesophageal reflux disease)     Hyperlipidemia     Hypertension     Seasonal allergies     Not sure    Seizures         Past Surgical History:   Procedure Laterality Date    COLONOSCOPY      HERNIA REPAIR         Family History   Problem Relation Age of Onset    Cancer Mother         Colon cancer    Diabetes Mother     Hypertension Mother     Cancer Father     Diabetes Maternal Grandmother     Stroke Maternal Grandmother         Social History     Tobacco Use    Smoking status: Every Day     Current packs/day: 1.50     Average packs/day: 1.5 packs/day for 15.0 years (22.5 ttl pk-yrs)     Types: Cigarettes     Passive exposure: Never    Smokeless tobacco: Never    Tobacco comments:     Not ready   Substance and Sexual Activity    Alcohol use: Yes     Alcohol/week: 12.0 standard drinks of alcohol     Types: 12 Cans of beer per week     Comment: not drinking (3 months)    Drug use: Never    Sexual activity: Not Currently     Partners: Female     Birth control/protection: None       Current Outpatient Medications   Medication Instructions    albuterol (VENTOLIN HFA) 90 mcg/actuation inhaler 2 puffs, Inhalation, Every 6 hours PRN, Rescue    azelastine (ASTELIN) 137 mcg, Nasal, 2 times daily    cetirizine (ZYRTEC) 10 mg, Oral, Daily    losartan-hydrochlorothiazide 50-12.5 mg (HYZAAR) 50-12.5 mg per tablet 1 tablet, Oral    nicotine polacrilex 2 mg, Oral, Every 2 hours PRN    pantoprazole (PROTONIX) 40 mg, Oral, Daily    polyethylene glycol (GLYCOLAX) 17 g, Oral, Daily    rosuvastatin  "(CRESTOR) 10 mg, Oral    sildenafiL (VIAGRA) 50 mg, Oral, Daily PRN    traZODone (DESYREL) 50 mg, Oral, Nightly        Review of patient's allergies indicates:  No Known Allergies    Objective:     Visit Vitals  /79 (BP Location: Right arm, Patient Position: Sitting, BP Method: Medium (Automatic))   Pulse 80   Temp 97.6 °F (36.4 °C) (Oral)   Resp 20   Ht 5' 8" (1.727 m)   Wt 76 kg (167 lb 8 oz)   SpO2 99%   BMI 25.47 kg/m²       Physical Examination:     Physical Exam  Constitutional:       General: He is not in acute distress.  Eyes:      General: No scleral icterus.     Extraocular Movements: Extraocular movements intact.      Conjunctiva/sclera: Conjunctivae normal.      Pupils: Pupils are equal, round, and reactive to light.   Cardiovascular:      Rate and Rhythm: Normal rate and regular rhythm.      Heart sounds: No murmur heard.  Pulmonary:      Effort: Pulmonary effort is normal. No respiratory distress.      Breath sounds: No stridor. No wheezing or rhonchi.   Abdominal:      General: Bowel sounds are normal. There is no distension.      Palpations: Abdomen is soft.      Tenderness: There is no abdominal tenderness. There is no guarding.   Musculoskeletal:         General: No swelling. Normal range of motion.   Skin:     General: Skin is warm and dry.      Coloration: Skin is not jaundiced.      Findings: No rash.   Neurological:      Mental Status: He is alert.      Gait: Gait normal.   Psychiatric:         Thought Content: Thought content normal.         Lab Results:     Chemistry:  Lab Results   Component Value Date     10/26/2022    K 4.4 10/26/2022    CHLORIDE 101 10/26/2022    BUN 14.1 10/26/2022    CREATININE 1.18 10/26/2022    EGFRNORACEVR >60 10/26/2022    GLUCOSE 84 10/26/2022    CALCIUM 10.3 (H) 10/26/2022    ALKPHOS 51 10/26/2022    LABPROT 8.0 10/26/2022    ALBUMIN 4.1 10/26/2022    BILIDIR 0.1 08/25/2021    IBILI 0.10 08/25/2021    AST 23 10/26/2022    ALT 23 10/26/2022    TSH " "0.8864 09/21/2022    VZSQHX7XAWF 0.97 09/21/2022    PSA 0.33 08/25/2021        Lab Results   Component Value Date    HGBA1C 5.2 09/21/2022        Hematology:  Lab Results   Component Value Date    WBC 12.6 (H) 09/21/2022    HGB 15.4 09/21/2022    HCT 47.4 09/21/2022     (H) 09/21/2022       Lipid Panel:  Lab Results   Component Value Date    CHOL 149 09/21/2022    HDL 36 09/21/2022    LDL 87.00 09/21/2022    TRIG 128 09/21/2022    TOTALCHOLEST 4 09/21/2022        Urine:  No results found for: "COLORUA", "APPEARANCEUA", "SGUA", "PHUA", "PROTEINUA", "GLUCOSEUA", "KETONESUA", "BLOODUA", "NITRITESUA", "LEUKOCYTESUR", "RBCUA", "WBCUA", "BACTERIA", "SQEPUA", "HYALINECASTS", "CREATRANDUR", "PROTEINURINE", "UPROTCREA"     Assessment:          ICD-10-CM ICD-9-CM   1. Flu vaccine need  Z23 V04.81   2. Primary hypertension  I10 401.9   3. Primary insomnia  F51.01 307.42   4. Prostate cancer screening  Z12.5 V76.44   5. Wellness examination  Z00.00 V70.0   6. Chronic obstructive pulmonary disease, unspecified COPD type  J44.9 496   7. SOB (shortness of breath)  R06.02 786.05   8. Insomnia, unspecified type  G47.00 780.52   9. Depression, unspecified depression type  F32.A 311   10. Convulsions, unspecified convulsion type  R56.9 780.39   11. Other hyperlipidemia  E78.49 272.4   12. Smoker  F17.200 305.1        Plan:     1. Flu vaccine need  -     influenza (QUADRIVALENT PF) vaccine 0.5 mL    2. Primary hypertension  Overview:        Assessment & Plan:  Reviewed blood pressure readings with patient   Will continue hydrochlorothiazide losartan 12.5/50       Orders:  -     Comprehensive Metabolic Panel; Future; Expected date: 09/29/2023    3. Primary insomnia  Overview:  Trazodone 50 mg with refills     Orders:  -     TSH; Future; Expected date: 09/29/2023  -     T4, Free; Future; Expected date: 09/29/2023    4. Prostate cancer screening  -     PSA, Screening; Future; Expected date: 09/29/2023    5. Wellness " examination  Assessment & Plan:  Labs have been ordered for patient states he will completed outside facility    Orders:  -     CBC Auto Differential; Future; Expected date: 09/29/2023  -     Comprehensive Metabolic Panel; Future; Expected date: 09/29/2023  -     Lipid Panel; Future; Expected date: 09/29/2023  -     TSH; Future; Expected date: 09/29/2023  -     Hemoglobin A1C; Future; Expected date: 09/29/2023  -     Urinalysis; Future; Expected date: 09/29/2023  -     PSA, Screening; Future; Expected date: 09/29/2023  -     T4, Free; Future; Expected date: 09/29/2023  -     Vitamin D; Future; Expected date: 09/29/2023    6. Chronic obstructive pulmonary disease, unspecified COPD type  -     Complete PFT w/ bronchodilator; Future    7. SOB (shortness of breath)  -     albuterol (VENTOLIN HFA) 90 mcg/actuation inhaler; Inhale 2 puffs into the lungs every 6 (six) hours as needed for Wheezing. Rescue  Dispense: 18 g; Refill: 0    8. Insomnia, unspecified type  -     traZODone (DESYREL) 50 MG tablet; Take 1 tablet (50 mg total) by mouth every evening.  Dispense: 30 tablet; Refill: 11    9. Depression, unspecified depression type  -     traZODone (DESYREL) 50 MG tablet; Take 1 tablet (50 mg total) by mouth every evening.  Dispense: 30 tablet; Refill: 11    10. Convulsions, unspecified convulsion type  Assessment & Plan:  Patient had 1 time seizure no further seizure activity has been noted        11. Other hyperlipidemia  Assessment & Plan:  Cholesterol panel today   Continue statin therapy      12. Smoker  Assessment & Plan:  Spent 3 minutes discussing smoking cessation with patient as he does have COPD and this is 1 of the ways to decrease the progression of lung damage new line    Orders:  -     nicotine polacrilex 2 MG Lozg; Take 1 lozenge (2 mg total) by mouth every 2 (two) hours as needed (Smoking urge).  Dispense: 108 lozenge; Refill: 11         Follow up in about 3 months (around 12/29/2023) for Hypertension, BP  check.    Future Appointments   Date Time Provider Department Center   10/27/2023  7:45 AM Regina Joseph MD [facility]        Regina Joseph MD

## 2023-10-03 DIAGNOSIS — R06.02 SOB (SHORTNESS OF BREATH): ICD-10-CM

## 2023-10-03 RX ORDER — ALBUTEROL SULFATE 90 UG/1
AEROSOL, METERED RESPIRATORY (INHALATION)
Qty: 8.5 G | Refills: 0 | OUTPATIENT
Start: 2023-10-03

## 2023-10-03 NOTE — TELEPHONE ENCOUNTER
Provider Staff:  Action required for this patient     Please see care gap opportunities below in Care Due Message.    Thanks!  [facility]     Appointments      Date Provider   Last Visit   9/29/2023 Regina Joseph MD   Next Visit   10/27/2023 Regina Joseph MD     Refill Decision Note   Harjit Handley  is requesting a refill authorization.  Brief Assessment and Rationale for Refill:  Quick Discontinue     Medication Therapy Plan:  Receipt confirmed by pharmacy (9/29/2023 12:31 PM CDT)      Comments:     Note composed:12:43 PM 10/03/2023

## 2023-10-03 NOTE — TELEPHONE ENCOUNTER
Care Due:                  Date            Visit Type   Department     Provider  --------------------------------------------------------------------------------                                EP -                              PRIMARY      [facility]  Last Visit: 09-      CARE ([facility])   MAR Joseph                               -                              [facility]  Next Visit: 10-      CARE ([facility])   MAR Joseph                                                            Last  Test          Frequency    Reason                     Performed    Due Date  --------------------------------------------------------------------------------    CMP.........  12 months..  losartan-hydrochlorothiaz  10-   10-                             obdulio, rosuvastatin........    Lipid Panel.  12 months..  rosuvastatin.............  09- 09-    Health [facility] Embedded Care Due Messages. Reference number: [id number].   10/03/2023 11:57:09 AM CDT

## 2023-10-06 ENCOUNTER — PATIENT OUTREACH (OUTPATIENT)
Dept: ADMINISTRATIVE | Facility: HOSPITAL | Age: 58
End: 2023-10-06
Payer: COMMERCIAL

## 2023-10-06 NOTE — PROGRESS NOTES
Population Health Outreach. Chart Review.   The following below is/are DUE for Health Maintenance.     Colorectal Cancer Screening: Gi Referral. Appt with Dr Leroy arana 10/9/2023    Population Health Chart Review & Patient Outreach Details:     Reason for Outreach Encounter:     []  Non-Compliant Report   [x]  Payor Report ([employer], PHN, BCBS, MSSP, MCIP, UHC, etc.)   [x]  Pre-Visit Chart Review     Updates Requested / Reviewed:     [x]  Care Everywhere    [x]     [x]  External Sources ([employer], [facility], [employer], DIS, etc.)   [x]  Care Team Updated    Patient Outreach Method:    [x]  Telephone Outreach Completed   [x] Successful   [] Left Voicemail   [] Unable to Contact (wrong number, no voicemail)  []  [facility] Outreach Sent  []  Letter Outreach Mailed  []  Fax Sent for External Records  []  External Records Upload    Health Maintenance Topics Addressed and Outreach Outcomes / Actions Taken:        []      Breast Cancer Screening []  Mammo Scheduled      []  External Records Requested     []  Added Reminder to Complete to Upcoming Primary Care Appt Notes     []  Patient Declined     []  Patient Will Call Back to Schedule     []  Patient Will Schedule with External Provider / Order Routed if Applicable             []       Cervical Cancer Screening []  Pap Scheduled      []  External Records Requested     []  Added Reminder to Complete to Upcoming Primary Care Appt Notes     []  Patient Declined     []  Patient Will Call Back to Schedule     []  Patient Will Schedule with External Provider               [x]          Colorectal Cancer Screening []  Colonoscopy Case Request or Referral Placed     []  External Records Requested     []  Added Reminder to Complete to Upcoming Primary Care Appt Notes     []  Patient Declined     []  Patient Will Call Back to Schedule     []  Patient Will Schedule with External Provider     []  Fit Kit Mailed (add the SmartPhrase under additional notes)     []  Reminded  Patient to Complete Home Test             []      Diabetic Eye Exam []  Eye Camera Scheduled or Optometry Referral Placed     []  External Records Requested     []  Added Reminder to Complete to Upcoming Primary Care Appt Notes     []  Patient Declined     []  Patient Will Call Back to Schedule     []  Patient Will Schedule with External Provider             [x]      Blood Pressure Control [x]  Primary Care Follow Up Visit Scheduled 10/27/2023     []  Remote Blood Pressure Reading Captured     []  Added Reminder to Complete to Upcoming Primary Care Appt Notes     []  Patient Declined     []  Patient Will Call Back / Patient Will Send Portal Message with Reading     []  Patient Will Call Back to Schedule Provider Visit             []       HbA1c & Other Labs []  Lab Appt Scheduled for Due Labs     []  Primary Care Follow Up Visit Scheduled      []  Reminded Patient to Complete Home Test     []  Added Reminder to Complete to Upcoming Primary Care Appt Notes     []  Patient Declined     []  Patient Will Call Back to Schedule     []  Patient Will Schedule with External Provider / Order Routed if Applicable           []    Schedule Primary Care Appt []  Primary Care Appt Scheduled     []  Patient Declined     []  Patient Will Call Back to Schedule     []  Pt Established with External Provider & Updated Care Team             []      Medication Adherence []  Primary Care Appointment Scheduled     []  Added Reminder to Upcoming Primary Care Appt Notes     []  Patient Reminded to  Prescription     []  Patient Declined, Provider Notified if Needed     []  Sent Provider Message to Review and/or Add Exclusion to Problem List             []      Osteoporosis Screening []  DXA Appointment Scheduled     []  External Records Requested     []  Added Reminder to Complete to Upcoming Primary Care Appt Notes     []  Patient Declined     []  Patient Will Call Back to Schedule     []  Patient Will Schedule with External Provider /  Order Routed if Applicable     Additional Care Coordinator Notes:         Further Action Needed If Patient Returns Outreach:

## 2023-10-25 DIAGNOSIS — K21.9 GASTROESOPHAGEAL REFLUX DISEASE, UNSPECIFIED WHETHER ESOPHAGITIS PRESENT: ICD-10-CM

## 2023-10-25 RX ORDER — PANTOPRAZOLE SODIUM 40 MG/1
40 TABLET, DELAYED RELEASE ORAL
Qty: 90 TABLET | Refills: 3 | Status: SHIPPED | OUTPATIENT
Start: 2023-10-25

## 2023-10-25 NOTE — TELEPHONE ENCOUNTER
No care due was identified.  [facility] Embedded Care Due Messages. Reference number: [id number].   10/25/2023 7:45:47 AM CDT

## 2023-10-25 NOTE — TELEPHONE ENCOUNTER
Refill Decision Note      Refill Decision Note   Harjit Handley  is requesting a refill authorization.  Brief Assessment and Rationale for Refill:  Approve     Medication Therapy Plan:         Comments:     Note composed:8:13 AM 10/25/2023             Appointments     Last Visit   9/29/2023 Regina Joseph MD   Next Visit   10/27/2023 Regina Joseph MD

## 2023-11-09 ENCOUNTER — PATIENT MESSAGE (OUTPATIENT)
Dept: FAMILY MEDICINE | Facility: CLINIC | Age: 58
End: 2023-11-09
Payer: COMMERCIAL

## 2023-11-22 ENCOUNTER — TELEPHONE (OUTPATIENT)
Dept: SMOKING CESSATION | Facility: CLINIC | Age: 58
End: 2023-11-22
Payer: COMMERCIAL

## 2023-11-22 NOTE — TELEPHONE ENCOUNTER
1st attempt left message with his wife regarding smoking cessation 12 month telephone follow up for quit 1 episode. Pt's wife answered the phone and states he can call back at a later time to discuss progress. Provided phone number to best reach CTTS at [phone].

## 2023-11-25 DIAGNOSIS — E78.5 HYPERLIPIDEMIA, UNSPECIFIED HYPERLIPIDEMIA TYPE: ICD-10-CM

## 2023-11-25 NOTE — TELEPHONE ENCOUNTER
No care due was identified.  Health [facility] Embedded Care Due Messages. Reference number: [id number].   11/25/2023 8:01:51 AM CST

## 2023-11-26 NOTE — TELEPHONE ENCOUNTER
Refill Routing Note   Medication(s) are not appropriate for processing by [facility] for the following reason(s):      Required labs outdated    [facility] action(s):  Defer Care Due:  None identified            Appointments  past 12m or future 3m with PCP    Date Provider   Last Visit   9/29/2023 Regina Joseph MD   Next Visit   12/27/2023 Regina Joseph MD   ED visits in past 90 days: 0        Note composed:10:16 AM 11/26/2023

## 2023-11-27 RX ORDER — ROSUVASTATIN CALCIUM 10 MG/1
10 TABLET, COATED ORAL
Qty: 90 TABLET | Refills: 0 | Status: SHIPPED | OUTPATIENT
Start: 2023-11-27 | End: 2023-11-30 | Stop reason: SDUPTHER

## 2023-11-29 ENCOUNTER — TELEPHONE (OUTPATIENT)
Dept: SMOKING CESSATION | Facility: CLINIC | Age: 58
End: 2023-11-29
Payer: COMMERCIAL

## 2023-11-29 NOTE — TELEPHONE ENCOUNTER
2nd attempt left message regarding smoking cessation 12 month telephone follow up for quit 1 episode.

## 2023-11-30 ENCOUNTER — PATIENT MESSAGE (OUTPATIENT)
Dept: FAMILY MEDICINE | Facility: CLINIC | Age: 58
End: 2023-11-30
Payer: COMMERCIAL

## 2023-11-30 DIAGNOSIS — E78.5 HYPERLIPIDEMIA, UNSPECIFIED HYPERLIPIDEMIA TYPE: ICD-10-CM

## 2023-12-01 DIAGNOSIS — I10 HYPERTENSION, UNSPECIFIED TYPE: ICD-10-CM

## 2023-12-01 RX ORDER — ROSUVASTATIN CALCIUM 10 MG/1
TABLET, COATED ORAL
Qty: 90 TABLET | Refills: 0 | Status: SHIPPED | OUTPATIENT
Start: 2023-12-01

## 2023-12-01 NOTE — TELEPHONE ENCOUNTER
No care due was identified.  [facility] Embedded Care Due Messages. Reference number: [id number].   12/01/2023 11:17:02 AM CST

## 2023-12-01 NOTE — TELEPHONE ENCOUNTER
No care due was identified.  Health [facility] Embedded Care Due Messages. Reference number: [id number].   11/30/2023 7:27:31 PM CST

## 2023-12-01 NOTE — TELEPHONE ENCOUNTER
Refill Decision Note   Harjit Handley  is requesting a refill authorization.  Brief Assessment and Rationale for Refill:  Approve     Medication Therapy Plan:  ADDED PHARMACY; PT. REQUESTING A NEW PHARMACY      Comments:     Note composed:10:00 AM 12/01/2023             Appointments     Last Visit   9/29/2023 Regina Joseph MD   Next Visit   12/27/2023 Regina Joseph MD

## 2023-12-02 NOTE — TELEPHONE ENCOUNTER
Refill Routing Note   Medication(s) are not appropriate for processing by [facility] for the following reason(s):        Required labs outdated    [facility] action(s):  Defer               Appointments  past 12m or future 3m with PCP    Date Provider   Last Visit   9/29/2023 Regina Joseph MD   Next Visit   12/27/2023 Regina Joseph MD   ED visits in past 90 days: 0        Note composed:9:27 AM 12/02/2023

## 2023-12-08 RX ORDER — LOSARTAN POTASSIUM AND HYDROCHLOROTHIAZIDE 12.5; 5 MG/1; MG/1
1 TABLET ORAL DAILY
Qty: 90 TABLET | Refills: 0 | Status: SHIPPED | OUTPATIENT
Start: 2023-12-08

## 2024-01-01 PROBLEM — Z00.00 WELLNESS EXAMINATION: Status: RESOLVED | Noted: 2023-09-29 | Resolved: 2024-01-01

## 2024-01-22 ENCOUNTER — PATIENT MESSAGE (OUTPATIENT)
Dept: FAMILY MEDICINE | Facility: CLINIC | Age: 59
End: 2024-01-22

## 2024-01-22 ENCOUNTER — OFFICE VISIT (OUTPATIENT)
Dept: FAMILY MEDICINE | Facility: CLINIC | Age: 59
End: 2024-01-22
Payer: COMMERCIAL

## 2024-01-22 VITALS
DIASTOLIC BLOOD PRESSURE: 73 MMHG | TEMPERATURE: 98 F | SYSTOLIC BLOOD PRESSURE: 135 MMHG | WEIGHT: 176 LBS | HEIGHT: 68 IN | HEART RATE: 83 BPM | OXYGEN SATURATION: 99 % | BODY MASS INDEX: 26.67 KG/M2

## 2024-01-22 DIAGNOSIS — Z86.010 HISTORY OF COLON POLYPS: ICD-10-CM

## 2024-01-22 DIAGNOSIS — K59.00 CONSTIPATION, UNSPECIFIED CONSTIPATION TYPE: ICD-10-CM

## 2024-01-22 DIAGNOSIS — E78.49 OTHER HYPERLIPIDEMIA: ICD-10-CM

## 2024-01-22 DIAGNOSIS — Z00.00 WELLNESS EXAMINATION: Primary | ICD-10-CM

## 2024-01-22 DIAGNOSIS — N52.8 OTHER MALE ERECTILE DYSFUNCTION: ICD-10-CM

## 2024-01-22 DIAGNOSIS — I10 PRIMARY HYPERTENSION: ICD-10-CM

## 2024-01-22 DIAGNOSIS — K59.09 OTHER CONSTIPATION: ICD-10-CM

## 2024-01-22 DIAGNOSIS — N52.9 ERECTILE DYSFUNCTION, UNSPECIFIED ERECTILE DYSFUNCTION TYPE: ICD-10-CM

## 2024-01-22 PROBLEM — R56.9 CONVULSIONS, UNSPECIFIED CONVULSION TYPE: Status: RESOLVED | Noted: 2023-09-29 | Resolved: 2024-01-22

## 2024-01-22 LAB
ALBUMIN SERPL-MCNC: 3.9 G/DL (ref 3.4–4.8)
ALBUMIN/GLOB SERPL: 1 RATIO (ref 1.1–2)
ALP SERPL-CCNC: 51 UNIT/L (ref 40–150)
ALT SERPL-CCNC: 24 UNIT/L (ref 0–55)
APPEARANCE UR: CLEAR
AST SERPL-CCNC: 23 UNIT/L (ref 5–34)
BACTERIA #/AREA URNS AUTO: NORMAL /HPF
BASOPHILS # BLD AUTO: 0.09 X10(3)/MCL
BASOPHILS NFR BLD AUTO: 0.8 %
BILIRUB SERPL-MCNC: 0.2 MG/DL
BILIRUB UR QL STRIP.AUTO: NEGATIVE
BUN SERPL-MCNC: 21.4 MG/DL (ref 8.4–25.7)
CALCIUM SERPL-MCNC: 9.9 MG/DL (ref 8.8–10)
CHLORIDE SERPL-SCNC: 104 MMOL/L (ref 98–107)
CHOLEST SERPL-MCNC: 137 MG/DL
CHOLEST/HDLC SERPL: 5 {RATIO} (ref 0–5)
CO2 SERPL-SCNC: 25 MMOL/L (ref 23–31)
COLOR UR AUTO: NORMAL
CREAT SERPL-MCNC: 1.34 MG/DL (ref 0.73–1.18)
DEPRECATED CALCIDIOL+CALCIFEROL SERPL-MC: 37 NG/ML (ref 30–80)
EOSINOPHIL # BLD AUTO: 0.27 X10(3)/MCL (ref 0–0.9)
EOSINOPHIL NFR BLD AUTO: 2.3 %
ERYTHROCYTE [DISTWIDTH] IN BLOOD BY AUTOMATED COUNT: 14.6 % (ref 11.5–17)
EST. AVERAGE GLUCOSE BLD GHB EST-MCNC: 114 MG/DL
GFR SERPLBLD CREATININE-BSD FMLA CKD-EPI: >60 MLS/MIN/1.73/M2
GLOBULIN SER-MCNC: 4 GM/DL (ref 2.4–3.5)
GLUCOSE SERPL-MCNC: 95 MG/DL (ref 82–115)
GLUCOSE UR QL STRIP.AUTO: NORMAL
HBA1C MFR BLD: 5.6 %
HCT VFR BLD AUTO: 46.4 % (ref 42–52)
HDLC SERPL-MCNC: 30 MG/DL (ref 35–60)
HGB BLD-MCNC: 15.5 G/DL (ref 14–18)
HYALINE CASTS #/AREA URNS LPF: NORMAL /LPF
IMM GRANULOCYTES # BLD AUTO: 0.02 X10(3)/MCL (ref 0–0.04)
IMM GRANULOCYTES NFR BLD AUTO: 0.2 %
KETONES UR QL STRIP.AUTO: NEGATIVE
LDLC SERPL CALC-MCNC: 80 MG/DL (ref 50–140)
LEUKOCYTE ESTERASE UR QL STRIP.AUTO: NEGATIVE
LYMPHOCYTES # BLD AUTO: 4.8 X10(3)/MCL (ref 0.6–4.6)
LYMPHOCYTES NFR BLD AUTO: 40.3 %
MCH RBC QN AUTO: 27.8 PG (ref 27–31)
MCHC RBC AUTO-ENTMCNC: 33.4 G/DL (ref 33–36)
MCV RBC AUTO: 83.2 FL (ref 80–94)
MONOCYTES # BLD AUTO: 1 X10(3)/MCL (ref 0.1–1.3)
MONOCYTES NFR BLD AUTO: 8.4 %
NEUTROPHILS # BLD AUTO: 5.72 X10(3)/MCL (ref 2.1–9.2)
NEUTROPHILS NFR BLD AUTO: 48 %
NITRITE UR QL STRIP.AUTO: NEGATIVE
NRBC BLD AUTO-RTO: 0 %
PH UR STRIP.AUTO: 7 [PH]
PLATELET # BLD AUTO: 376 X10(3)/MCL (ref 130–400)
PMV BLD AUTO: 9.7 FL (ref 7.4–10.4)
POTASSIUM SERPL-SCNC: 3.8 MMOL/L (ref 3.5–5.1)
PROT SERPL-MCNC: 7.9 GM/DL (ref 5.8–7.6)
PROT UR QL STRIP.AUTO: NEGATIVE
PSA SERPL-MCNC: 0.28 NG/ML
RBC # BLD AUTO: 5.58 X10(6)/MCL (ref 4.7–6.1)
RBC #/AREA URNS AUTO: NORMAL /HPF
RBC UR QL AUTO: NEGATIVE
SODIUM SERPL-SCNC: 138 MMOL/L (ref 136–145)
SP GR UR STRIP.AUTO: 1.01 (ref 1–1.03)
SQUAMOUS #/AREA URNS LPF: NORMAL /HPF
T4 FREE SERPL-MCNC: 1.02 NG/DL (ref 0.7–1.48)
TRIGL SERPL-MCNC: 135 MG/DL (ref 34–140)
TSH SERPL-ACNC: 0.9 UIU/ML (ref 0.35–4.94)
UROBILINOGEN UR STRIP-ACNC: NORMAL
VLDLC SERPL CALC-MCNC: 27 MG/DL
WBC # SPEC AUTO: 11.9 X10(3)/MCL (ref 4.5–11.5)
WBC #/AREA URNS AUTO: NORMAL /HPF

## 2024-01-22 PROCEDURE — 85025 COMPLETE CBC W/AUTO DIFF WBC: CPT | Performed by: STUDENT IN AN ORGANIZED HEALTH CARE EDUCATION/TRAINING PROGRAM

## 2024-01-22 PROCEDURE — 99396 PREV VISIT EST AGE 40-64: CPT | Mod: S$PBB,,, | Performed by: STUDENT IN AN ORGANIZED HEALTH CARE EDUCATION/TRAINING PROGRAM

## 2024-01-22 PROCEDURE — 81001 URINALYSIS AUTO W/SCOPE: CPT | Performed by: STUDENT IN AN ORGANIZED HEALTH CARE EDUCATION/TRAINING PROGRAM

## 2024-01-22 PROCEDURE — 3078F DIAST BP <80 MM HG: CPT | Mod: CPTII,,, | Performed by: STUDENT IN AN ORGANIZED HEALTH CARE EDUCATION/TRAINING PROGRAM

## 2024-01-22 PROCEDURE — 80053 COMPREHEN METABOLIC PANEL: CPT | Performed by: STUDENT IN AN ORGANIZED HEALTH CARE EDUCATION/TRAINING PROGRAM

## 2024-01-22 PROCEDURE — 3075F SYST BP GE 130 - 139MM HG: CPT | Mod: CPTII,,, | Performed by: STUDENT IN AN ORGANIZED HEALTH CARE EDUCATION/TRAINING PROGRAM

## 2024-01-22 PROCEDURE — 84443 ASSAY THYROID STIM HORMONE: CPT | Performed by: STUDENT IN AN ORGANIZED HEALTH CARE EDUCATION/TRAINING PROGRAM

## 2024-01-22 PROCEDURE — 36415 COLL VENOUS BLD VENIPUNCTURE: CPT | Performed by: STUDENT IN AN ORGANIZED HEALTH CARE EDUCATION/TRAINING PROGRAM

## 2024-01-22 PROCEDURE — 3008F BODY MASS INDEX DOCD: CPT | Mod: CPTII,,, | Performed by: STUDENT IN AN ORGANIZED HEALTH CARE EDUCATION/TRAINING PROGRAM

## 2024-01-22 PROCEDURE — 84153 ASSAY OF PSA TOTAL: CPT | Performed by: STUDENT IN AN ORGANIZED HEALTH CARE EDUCATION/TRAINING PROGRAM

## 2024-01-22 PROCEDURE — 82306 VITAMIN D 25 HYDROXY: CPT | Performed by: STUDENT IN AN ORGANIZED HEALTH CARE EDUCATION/TRAINING PROGRAM

## 2024-01-22 PROCEDURE — 83036 HEMOGLOBIN GLYCOSYLATED A1C: CPT | Performed by: STUDENT IN AN ORGANIZED HEALTH CARE EDUCATION/TRAINING PROGRAM

## 2024-01-22 PROCEDURE — 99214 OFFICE O/P EST MOD 30 MIN: CPT | Mod: PBBFAC,PN | Performed by: STUDENT IN AN ORGANIZED HEALTH CARE EDUCATION/TRAINING PROGRAM

## 2024-01-22 PROCEDURE — 1159F MED LIST DOCD IN RCRD: CPT | Mod: CPTII,,, | Performed by: STUDENT IN AN ORGANIZED HEALTH CARE EDUCATION/TRAINING PROGRAM

## 2024-01-22 PROCEDURE — 84439 ASSAY OF FREE THYROXINE: CPT | Performed by: STUDENT IN AN ORGANIZED HEALTH CARE EDUCATION/TRAINING PROGRAM

## 2024-01-22 PROCEDURE — 80061 LIPID PANEL: CPT | Performed by: STUDENT IN AN ORGANIZED HEALTH CARE EDUCATION/TRAINING PROGRAM

## 2024-01-22 RX ORDER — TADALAFIL 5 MG/1
5 TABLET ORAL DAILY PRN
Qty: 30 TABLET | Refills: 9 | Status: SHIPPED | OUTPATIENT
Start: 2024-01-22 | End: 2025-01-21

## 2024-01-22 RX ORDER — POLYETHYLENE GLYCOL 3350 17 G/17G
17 POWDER, FOR SOLUTION ORAL DAILY
Qty: 595 G | Refills: 11 | Status: SHIPPED | OUTPATIENT
Start: 2024-01-22

## 2024-01-22 RX ORDER — AMLODIPINE BESYLATE 10 MG/1
10 TABLET ORAL
COMMUNITY
Start: 2021-09-21 | End: 2024-01-22

## 2024-01-22 RX ORDER — FLUTICASONE PROPIONATE 50 MCG
2 SPRAY, SUSPENSION (ML) NASAL 2 TIMES DAILY
COMMUNITY
Start: 2023-12-19

## 2024-01-22 NOTE — PROGRESS NOTES
Patient Name: Harjit Handley     : 1965    MRN: [id number]     Subjective:     Patient ID: Harjit Handley is a 58 y.o. male.    Chief Complaint:   Chief Complaint   Patient presents with    Follow-up     Follow up no problems 135/73        HPI: HPI  58-year-old male returns to clinic for follow-up of blood pressure  and wellness exam   Due for labs     Blood pressure 135/73     Losartan hydrochlorothiazide 50/12.5    Tobacco use  -is now smoking 1 pack/day w- Did sign commit to quit  -referring to smoking cessation  States that he thinks the patches are making him want to smoke more   Has lozenges  HLD  Patient with total cholesterol of 222 and elevated LDL  Crestor 10 mg Tolerating well    Elevated Creatinine  -Noted on lab work from first visit. Slightly decreased GFR      Shortness of breath   Patient is able to states he notices at times he is wheezing   Would like fill of albuterol CT indicates patient has a component of COPD    Depression/Insomnia  - occasional. Has been on medication in the past. Does not need medication at this time. NO SI Or HI  Stable on Trazodone 50 mg. States he takes half two to 3 days per week and whole on weekends.    Abdominal Pain/Constipation/History of 3 tubular adenomas (2017) descending colon  Started on Miralax-doing well  2017 history of 3 tubular adenomas.  Referred back to [facility]      Erectile dysfunction  Has trouble maintaining erections. No chest pain or other alarm symptoms. Viagra 50 mg tablets prescribed,  patient is now reporting that he is having trouble using Viagra to schedule sexual activity would like to go to [place]    Shingles 2021 and 2021  Pneumo 23 2022  Flu- 2022  Dtap 2021  Influenza 10/30/2021  Colonoscopy 2017 with 3 tubular adenomas. Referral placed  LD lung cancer screening-2021 BI-RADS 2;placed repeat this visit 10/26/    ROS:      ROS     12 point review of systems conducted, negative except as  stated in the history of present illness. See HPI for details.    History:     Past Medical History:   Diagnosis Date    GERD (gastroesophageal reflux disease)     Hyperlipidemia     Hypertension     Seasonal allergies     Not sure    Seizures         Past Surgical History:   Procedure Laterality Date    COLONOSCOPY      HERNIA REPAIR         Family History   Problem Relation Age of Onset    Cancer Mother         Colon cancer    Diabetes Mother     Hypertension Mother     Cancer Father     Diabetes Maternal Grandmother     Stroke Maternal Grandmother         Social History     Tobacco Use    Smoking status: Every Day     Current packs/day: 1.50     Average packs/day: 1.5 packs/day for 15.0 years (22.5 ttl pk-yrs)     Types: Cigarettes     Passive exposure: Never    Smokeless tobacco: Never    Tobacco comments:     Not ready   Substance and Sexual Activity    Alcohol use: Yes     Alcohol/week: 12.0 standard drinks of alcohol     Types: 12 Cans of beer per week     Comment: not drinking (3 months)    Drug use: Never    Sexual activity: Not Currently     Partners: Female     Birth control/protection: None       Current Outpatient Medications   Medication Instructions    albuterol (VENTOLIN HFA) 90 mcg/actuation inhaler 2 puffs, Inhalation, Every 6 hours PRN, Rescue    azelastine (ASTELIN) 137 mcg, Nasal, 2 times daily    cetirizine (ZYRTEC) 10 mg, Oral, Daily    fluticasone propionate (FLONASE) 50 mcg/actuation nasal spray 2 sprays, Each Nostril, 2 times daily    losartan-hydrochlorothiazide 50-12.5 mg (HYZAAR) 50-12.5 mg per tablet 1 tablet, Oral, Daily    nicotine polacrilex 2 mg, Oral, Every 2 hours PRN    pantoprazole (PROTONIX) 40 mg, Oral    polyethylene glycol (GLYCOLAX) 17 g, Oral, Daily    rosuvastatin (CRESTOR) 10 MG tablet Take 1 tablet (10 mg total) by mouth daily.    tadalafiL (CIALIS) 5 mg, Oral, Daily PRN, May start with half tablet daily. Can increase to 1 tablet after a week if needed.    traZODone  "(DESYREL) 50 mg, Oral, Nightly        Review of patient's allergies indicates:  No Known Allergies    Objective:     Visit Vitals  /73 (BP Location: Left arm, Patient Position: Sitting)   Pulse 83   Temp 98 °F (36.7 °C) (Oral)   Ht 5' 8" (1.727 m)   Wt 79.8 kg (176 lb)   SpO2 99%   BMI 26.76 kg/m²       Physical Examination:     Physical Exam  Constitutional:       General: He is not in acute distress.     Appearance: Normal appearance. He is not ill-appearing or diaphoretic.   HENT:      Nose: No congestion.   Eyes:      Conjunctiva/sclera: Conjunctivae normal.      Pupils: Pupils are equal, round, and reactive to light.   Cardiovascular:      Rate and Rhythm: Normal rate and regular rhythm.      Heart sounds: No murmur heard.  Pulmonary:      Effort: Pulmonary effort is normal. No respiratory distress.      Breath sounds: Normal breath sounds. No wheezing.   Musculoskeletal:         General: No swelling, tenderness, deformity or signs of injury. Normal range of motion.      Cervical back: Normal range of motion.   Skin:     General: Skin is warm and dry.      Coloration: Skin is not jaundiced.   Neurological:      General: No focal deficit present.      Mental Status: He is alert and oriented to person, place, and time. Mental status is at baseline.      Gait: Gait normal.         Lab Results:     Chemistry:  Lab Results   Component Value Date     10/26/2022    K 4.4 10/26/2022    CHLORIDE 101 10/26/2022    BUN 14.1 10/26/2022    CREATININE 1.18 10/26/2022    EGFRNORACEVR >60 10/26/2022    GLUCOSE 84 10/26/2022    CALCIUM 10.3 (H) 10/26/2022    ALKPHOS 51 10/26/2022    LABPROT 8.0 10/26/2022    ALBUMIN 4.1 10/26/2022    BILIDIR 0.1 08/25/2021    IBILI 0.10 08/25/2021    AST 23 10/26/2022    ALT 23 10/26/2022    TSH 0.8864 09/21/2022    ONQNXE4EBKK 0.97 09/21/2022    PSA 0.33 08/25/2021        Lab Results   Component Value Date    HGBA1C 5.2 09/21/2022        Hematology:  Lab Results   Component Value " "Date    WBC 12.6 (H) 09/21/2022    HGB 15.4 09/21/2022    HCT 47.4 09/21/2022     (H) 09/21/2022       Lipid Panel:  Lab Results   Component Value Date    CHOL 149 09/21/2022    HDL 36 09/21/2022    LDL 87.00 09/21/2022    TRIG 128 09/21/2022    TOTALCHOLEST 4 09/21/2022        Urine:  No results found for: "COLORUA", "APPEARANCEUA", "SGUA", "PHUA", "PROTEINUA", "GLUCOSEUA", "KETONESUA", "BLOODUA", "NITRITESUA", "LEUKOCYTESUR", "RBCUA", "WBCUA", "BACTERIA", "SQEPUA", "HYALINECASTS", "CREATRANDUR", "PROTEINURINE", "UPROTCREA"     Assessment:          ICD-10-CM ICD-9-CM   1. Wellness examination  Z00.00 V70.0   2. Primary hypertension  I10 401.9   3. Other hyperlipidemia  E78.49 272.4   4. History of colon polyps  Z86.010 V12.72   5. Erectile dysfunction, unspecified erectile dysfunction type  N52.9 607.84   6. Constipation, unspecified constipation type  K59.00 564.00   7. Other male erectile dysfunction  N52.8 607.84   8. Other constipation  K59.09 564.09        Plan:     1. Wellness examination  Assessment & Plan:   Labs as below    Orders:  -     CBC Auto Differential; Future; Expected date: 01/22/2024  -     Comprehensive Metabolic Panel; Future; Expected date: 01/22/2024  -     Lipid Panel; Future; Expected date: 01/22/2024  -     TSH; Future; Expected date: 01/22/2024  -     Hemoglobin A1C; Future; Expected date: 01/22/2024  -     Urinalysis; Future; Expected date: 01/22/2024  -     PSA, Screening; Future; Expected date: 01/22/2024  -     T4, Free; Future; Expected date: 01/22/2024  -     Vitamin D; Future; Expected date: 01/22/2024    2. Primary hypertension  Overview:        Assessment & Plan:   Controlled blood pressure 135/73 continue losartan hydrochlorothiazide 50/12.5    Orders:  -     Comprehensive Metabolic Panel; Future; Expected date: 01/22/2024    3. Other hyperlipidemia  -     Lipid Panel; Future; Expected date: 01/22/2024    4. History of colon polyps  Assessment & Plan:   Referral placed " to [facility]    Orders:  -     Ambulatory referral/consult to Gastroenterology; Future; Expected date: 01/29/2024    5. Erectile dysfunction, unspecified erectile dysfunction type  -     tadalafiL (CIALIS) 5 MG tablet; Take 1 tablet (5 mg total) by mouth daily as needed for Erectile Dysfunction. May start with half tablet daily. Can increase to 1 tablet after a week if needed.  Dispense: 30 tablet; Refill: 9    6. Constipation, unspecified constipation type  -     polyethylene glycol (GLYCOLAX) 17 gram/dose powder; Take 17 g by mouth once daily.  Dispense: 595 g; Refill: 11    7. Other male erectile dysfunction  Assessment & Plan:   Switching from Viagra to Cialis at 2.5 mg daily with titration to 5 mg      8. Other constipation  Assessment & Plan:   MiraLax 17 g Daily. Refills sent            Follow up in about 9 months (around 10/22/2024) for Hypertension, BP check.    Future Appointments   Date Time Provider Department Center   10/22/2024  8:20 AM Regina Joseph MD [facility]        Regina Joseph MD

## 2024-01-28 ENCOUNTER — PATIENT MESSAGE (OUTPATIENT)
Dept: FAMILY MEDICINE | Facility: CLINIC | Age: 59
End: 2024-01-28
Payer: COMMERCIAL

## 2024-02-02 ENCOUNTER — PATIENT MESSAGE (OUTPATIENT)
Dept: FAMILY MEDICINE | Facility: CLINIC | Age: 59
End: 2024-02-02
Payer: COMMERCIAL

## 2024-02-02 DIAGNOSIS — R79.89 ELEVATED SERUM CREATININE: Primary | ICD-10-CM

## 2024-02-06 ENCOUNTER — PATIENT OUTREACH (OUTPATIENT)
Dept: ADMINISTRATIVE | Facility: HOSPITAL | Age: 59
End: 2024-02-06
Payer: COMMERCIAL

## 2024-02-06 NOTE — PROGRESS NOTES
Reason for Outreach Encounter: Chart review. Health maintenance.    HTN controlled. Medication compliant.   Referral for colonoscopy to Dr KIRSTEN Moy 1/22/2024.    Health Maintenance   Topic Date Due   Colorectal CA Screening 5/22/2022   Hemoglobin A1c  1/22/2027   Diabetes Eye Exam N/A   Diabetes Foot Exam N/A   DM Urine Microalbumin/Creatinine Ratio  N/A   eGFR (CMP w/ eGFR) N/A   Lipid Panel 1/22/2029   LDCT Lung 9/6/2024   Statin Therapy For The Prevention & Treatment of CVD N/A   Smoking Cessation- Tobacco Quit Date Everyday Smoker      Controlled? Medication Compliance?   HTN: Blood Pressure Control Yes     Yes      Diabetes: A1c Control (< 8%)  N/A   N/A      Prescribed? Medication Compliance?   Statin Medication   N/A   N/A     VACCINES:  Tetanus: 9/21/2031  Influenza (Flu): 9/1/2024  Pneumococcal:  Shingles/Varicella-Zoster: completed

## 2024-07-28 ENCOUNTER — PATIENT MESSAGE (OUTPATIENT)
Dept: ADMINISTRATIVE | Facility: HOSPITAL | Age: 59
End: 2024-07-28
Payer: COMMERCIAL

## 2024-07-29 ENCOUNTER — PATIENT OUTREACH (OUTPATIENT)
Facility: CLINIC | Age: 59
End: 2024-07-29
Payer: COMMERCIAL

## 2024-07-29 NOTE — PROGRESS NOTES
Health Maintenance Topic(s) Outreach Outcomes & Actions Taken:    Colorectal Cancer Screening - Outreach Outcomes & Actions Taken  : Patient has seen DR. Jose Antonio Guillen recently and pcp has referred to Cortney Calixto.      Additional Notes:

## 2024-08-19 DIAGNOSIS — I10 HYPERTENSION, UNSPECIFIED TYPE: ICD-10-CM

## 2024-08-19 DIAGNOSIS — E78.5 HYPERLIPIDEMIA, UNSPECIFIED HYPERLIPIDEMIA TYPE: ICD-10-CM

## 2024-08-19 RX ORDER — ROSUVASTATIN CALCIUM 10 MG/1
TABLET, COATED ORAL
Qty: 90 TABLET | Refills: 1 | Status: SHIPPED | OUTPATIENT
Start: 2024-08-19

## 2024-08-19 RX ORDER — LOSARTAN POTASSIUM AND HYDROCHLOROTHIAZIDE 12.5; 5 MG/1; MG/1
1 TABLET ORAL
Qty: 270 TABLET | Refills: 1 | Status: SHIPPED | OUTPATIENT
Start: 2024-08-19

## 2024-08-19 NOTE — TELEPHONE ENCOUNTER
No care due was identified.  [facility] Embedded Care Due Messages. Reference number: [id number].   8/19/2024 9:11:52 AM CDT

## 2024-08-19 NOTE — TELEPHONE ENCOUNTER
Refill Decision Note   Harjit Handley  is requesting a refill authorization.  Brief Assessment and Rationale for Refill:  Approve     Medication Therapy Plan:         Comments:     Note composed:6:48 PM 08/19/2024             Appointments     Last Visit   1/22/2024 Regina Joseph MD   Next Visit   10/22/2024 Regina Joseph MD

## 2024-08-20 ENCOUNTER — PATIENT OUTREACH (OUTPATIENT)
Facility: CLINIC | Age: 59
End: 2024-08-20
Payer: COMMERCIAL

## 2024-08-20 ENCOUNTER — PATIENT MESSAGE (OUTPATIENT)
Dept: ADMINISTRATIVE | Facility: HOSPITAL | Age: 59
End: 2024-08-20
Payer: COMMERCIAL

## 2024-08-20 DIAGNOSIS — E78.5 HYPERLIPIDEMIA, UNSPECIFIED HYPERLIPIDEMIA TYPE: ICD-10-CM

## 2024-08-20 DIAGNOSIS — I10 HYPERTENSION, UNSPECIFIED TYPE: ICD-10-CM

## 2024-08-20 DIAGNOSIS — R09.81 NASAL CONGESTION: ICD-10-CM

## 2024-08-20 RX ORDER — CETIRIZINE HYDROCHLORIDE 10 MG/1
10 TABLET ORAL DAILY
Qty: 90 TABLET | Refills: 3 | Status: CANCELLED | OUTPATIENT
Start: 2024-08-20 | End: 2025-08-20

## 2024-08-20 RX ORDER — LOSARTAN POTASSIUM AND HYDROCHLOROTHIAZIDE 12.5; 5 MG/1; MG/1
1 TABLET ORAL
Qty: 270 TABLET | Refills: 1 | OUTPATIENT
Start: 2024-08-20

## 2024-08-20 RX ORDER — ROSUVASTATIN CALCIUM 10 MG/1
TABLET, COATED ORAL
Qty: 90 TABLET | Refills: 1 | OUTPATIENT
Start: 2024-08-20

## 2024-08-20 NOTE — PROGRESS NOTES
Health Maintenance Topic(s) Outreach Outcomes & Actions Taken:    Colorectal Cancer Screening - Outreach Outcomes & Actions Taken  : Pt Will Schedule with External Provider / Order Routed & Care Team Updated if Applicable and provider referred patient in January to [facility]. They were unable to contact [email or web address] to schedule. Number given to patient to call them and schedule.      Additional Notes:

## 2024-08-20 NOTE — TELEPHONE ENCOUNTER
No care due was identified.  [facility] Embedded Care Due Messages. Reference number: [id number].   8/20/2024 8:46:28 AM CDT

## 2024-09-04 ENCOUNTER — TELEPHONE (OUTPATIENT)
Dept: FAMILY MEDICINE | Facility: CLINIC | Age: 59
End: 2024-09-04
Payer: COMMERCIAL

## 2024-10-12 ENCOUNTER — TELEPHONE (OUTPATIENT)
Dept: PHARMACY | Facility: CLINIC | Age: 59
End: 2024-10-12
Payer: COMMERCIAL

## 2024-10-12 NOTE — TELEPHONE ENCOUNTER
[facility]/Population Health Chart Review & Patient Outreach Details For Medication Adherence Project    Reason for Outreach Encounter: 3rd Party payor non-compliance report ([employer], [employer], C, etc)  2.  Patient Outreach Method: Reviewed Patient Chart  3.   Medication in question: rosuvastatin    LAST FILLED: 8/20/24 for 90 day supply  Hyperlipidemia Medications               rosuvastatin (CRESTOR) 10 MG tablet TAKE ONE TABLET BY MOUTH ONCE A DAY               4.  Reviewed and or Updates Made To: Patient Chart  5. Outreach Outcomes and/or actions taken: Patient filled medication and is on track to be adherent

## 2024-10-19 ENCOUNTER — PATIENT MESSAGE (OUTPATIENT)
Dept: FAMILY MEDICINE | Facility: CLINIC | Age: 59
End: 2024-10-19
Payer: COMMERCIAL

## 2024-10-22 ENCOUNTER — OFFICE VISIT (OUTPATIENT)
Dept: FAMILY MEDICINE | Facility: CLINIC | Age: 59
End: 2024-10-22
Payer: COMMERCIAL

## 2024-10-22 VITALS
HEIGHT: 68 IN | RESPIRATION RATE: 20 BRPM | DIASTOLIC BLOOD PRESSURE: 89 MMHG | SYSTOLIC BLOOD PRESSURE: 145 MMHG | WEIGHT: 167.88 LBS | TEMPERATURE: 98 F | OXYGEN SATURATION: 100 % | BODY MASS INDEX: 25.44 KG/M2 | HEART RATE: 75 BPM

## 2024-10-22 DIAGNOSIS — M25.512 CHRONIC LEFT SHOULDER PAIN: ICD-10-CM

## 2024-10-22 DIAGNOSIS — Z78.9 INFLUENZA VACCINATION ORDERED: Primary | ICD-10-CM

## 2024-10-22 DIAGNOSIS — M25.552 CHRONIC LEFT HIP PAIN: ICD-10-CM

## 2024-10-22 DIAGNOSIS — F17.200 SMOKER: ICD-10-CM

## 2024-10-22 DIAGNOSIS — R79.89 ELEVATED SERUM CREATININE: ICD-10-CM

## 2024-10-22 DIAGNOSIS — I10 PRIMARY HYPERTENSION: ICD-10-CM

## 2024-10-22 DIAGNOSIS — G89.29 CHRONIC LEFT SHOULDER PAIN: ICD-10-CM

## 2024-10-22 DIAGNOSIS — I73.9 CLAUDICATION: ICD-10-CM

## 2024-10-22 DIAGNOSIS — G89.29 CHRONIC LEFT HIP PAIN: ICD-10-CM

## 2024-10-22 LAB
ANION GAP SERPL CALC-SCNC: 9 MEQ/L
BASOPHILS # BLD AUTO: 0.12 X10(3)/MCL
BASOPHILS NFR BLD AUTO: 1 %
BUN SERPL-MCNC: 12.1 MG/DL (ref 8.4–25.7)
CALCIUM SERPL-MCNC: 10.4 MG/DL (ref 8.8–10)
CHLORIDE SERPL-SCNC: 102 MMOL/L (ref 98–107)
CO2 SERPL-SCNC: 27 MMOL/L (ref 23–31)
CREAT SERPL-MCNC: 1.28 MG/DL (ref 0.72–1.25)
CREAT/UREA NIT SERPL: 9
EOSINOPHIL # BLD AUTO: 0.17 X10(3)/MCL (ref 0–0.9)
EOSINOPHIL NFR BLD AUTO: 1.4 %
ERYTHROCYTE [DISTWIDTH] IN BLOOD BY AUTOMATED COUNT: 14.1 % (ref 11.5–17)
GFR SERPLBLD CREATININE-BSD FMLA CKD-EPI: >60 ML/MIN/1.73/M2
GLUCOSE SERPL-MCNC: 96 MG/DL (ref 82–115)
HCT VFR BLD AUTO: 47.5 % (ref 42–52)
HGB BLD-MCNC: 15.5 G/DL (ref 14–18)
IMM GRANULOCYTES # BLD AUTO: 0.04 X10(3)/MCL (ref 0–0.04)
IMM GRANULOCYTES NFR BLD AUTO: 0.3 %
LYMPHOCYTES # BLD AUTO: 5.3 X10(3)/MCL (ref 0.6–4.6)
LYMPHOCYTES NFR BLD AUTO: 44.8 %
MCH RBC QN AUTO: 27.4 PG (ref 27–31)
MCHC RBC AUTO-ENTMCNC: 32.6 G/DL (ref 33–36)
MCV RBC AUTO: 83.9 FL (ref 80–94)
MONOCYTES # BLD AUTO: 0.77 X10(3)/MCL (ref 0.1–1.3)
MONOCYTES NFR BLD AUTO: 6.5 %
NEUTROPHILS # BLD AUTO: 5.44 X10(3)/MCL (ref 2.1–9.2)
NEUTROPHILS NFR BLD AUTO: 46 %
NRBC BLD AUTO-RTO: 0 %
PLATELET # BLD AUTO: 381 X10(3)/MCL (ref 130–400)
PMV BLD AUTO: 9.3 FL (ref 7.4–10.4)
POTASSIUM SERPL-SCNC: 3.9 MMOL/L (ref 3.5–5.1)
RBC # BLD AUTO: 5.66 X10(6)/MCL (ref 4.7–6.1)
SODIUM SERPL-SCNC: 138 MMOL/L (ref 136–145)
WBC # BLD AUTO: 11.84 X10(3)/MCL (ref 4.5–11.5)

## 2024-10-22 PROCEDURE — 99214 OFFICE O/P EST MOD 30 MIN: CPT | Mod: S$PBB,25,, | Performed by: STUDENT IN AN ORGANIZED HEALTH CARE EDUCATION/TRAINING PROGRAM

## 2024-10-22 PROCEDURE — 80048 BASIC METABOLIC PNL TOTAL CA: CPT | Performed by: STUDENT IN AN ORGANIZED HEALTH CARE EDUCATION/TRAINING PROGRAM

## 2024-10-22 PROCEDURE — 3077F SYST BP >= 140 MM HG: CPT | Mod: CPTII,,, | Performed by: STUDENT IN AN ORGANIZED HEALTH CARE EDUCATION/TRAINING PROGRAM

## 2024-10-22 PROCEDURE — 99215 OFFICE O/P EST HI 40 MIN: CPT | Mod: PBBFAC,PN | Performed by: STUDENT IN AN ORGANIZED HEALTH CARE EDUCATION/TRAINING PROGRAM

## 2024-10-22 PROCEDURE — 3079F DIAST BP 80-89 MM HG: CPT | Mod: CPTII,,, | Performed by: STUDENT IN AN ORGANIZED HEALTH CARE EDUCATION/TRAINING PROGRAM

## 2024-10-22 PROCEDURE — 90656 IIV3 VACC NO PRSV 0.5 ML IM: CPT | Mod: PBBFAC,PN

## 2024-10-22 PROCEDURE — 3044F HG A1C LEVEL LT 7.0%: CPT | Mod: CPTII,,, | Performed by: STUDENT IN AN ORGANIZED HEALTH CARE EDUCATION/TRAINING PROGRAM

## 2024-10-22 PROCEDURE — 90471 IMMUNIZATION ADMIN: CPT | Mod: PBBFAC,PN

## 2024-10-22 PROCEDURE — 1159F MED LIST DOCD IN RCRD: CPT | Mod: CPTII,,, | Performed by: STUDENT IN AN ORGANIZED HEALTH CARE EDUCATION/TRAINING PROGRAM

## 2024-10-22 PROCEDURE — 85025 COMPLETE CBC W/AUTO DIFF WBC: CPT | Performed by: STUDENT IN AN ORGANIZED HEALTH CARE EDUCATION/TRAINING PROGRAM

## 2024-10-22 PROCEDURE — 99406 BEHAV CHNG SMOKING 3-10 MIN: CPT | Mod: S$PBB,,, | Performed by: STUDENT IN AN ORGANIZED HEALTH CARE EDUCATION/TRAINING PROGRAM

## 2024-10-22 PROCEDURE — 36415 COLL VENOUS BLD VENIPUNCTURE: CPT | Performed by: STUDENT IN AN ORGANIZED HEALTH CARE EDUCATION/TRAINING PROGRAM

## 2024-10-22 PROCEDURE — 3008F BODY MASS INDEX DOCD: CPT | Mod: CPTII,,, | Performed by: STUDENT IN AN ORGANIZED HEALTH CARE EDUCATION/TRAINING PROGRAM

## 2024-10-22 RX ORDER — LOSARTAN POTASSIUM AND HYDROCHLOROTHIAZIDE 12.5; 1 MG/1; MG/1
1 TABLET ORAL DAILY
Qty: 90 TABLET | Refills: 3 | Status: SHIPPED | OUTPATIENT
Start: 2024-10-22 | End: 2025-10-22

## 2024-10-22 RX ORDER — DM/P-EPHED/ACETAMINOPH/DOXYLAM 30-7.5/3
2 LIQUID (ML) ORAL
Qty: 108 LOZENGE | Refills: 11 | Status: SHIPPED | OUTPATIENT
Start: 2024-10-22

## 2024-10-22 RX ORDER — SILDENAFIL 50 MG/1
TABLET, FILM COATED ORAL
COMMUNITY
Start: 2021-12-28 | End: 2024-10-22

## 2024-10-22 RX ADMIN — INFLUENZA VIRUS VACCINE 0.5 ML: 15; 15; 15 SUSPENSION INTRAMUSCULAR at 08:10

## 2024-10-22 NOTE — ASSESSMENT & PLAN NOTE
Spent 3 minutes discussing smoking cessation with patient and have ordered low-dose lung CT  Prescribed lozenges for patient

## 2024-10-22 NOTE — PROGRESS NOTES
Patient Name: Harjit Handley     : 1965    MRN: [id number]     Subjective:     Patient ID: Harjit Handley is a 59 y.o. male.    Chief Complaint:   Chief Complaint   Patient presents with    Follow-up     F/u appointment. BP check. Spouse states patient now snores at night.         HPI: HPI  59-year-old male returns to clinic for follow-up of blood pressure  and new issue of acute on chronic shoulder pain and hip pain.  Reports that hip pain has been insiduous and waxing and waning. Improves with stretching.    Also reporting that he has numbness and tingling around the left ankle.  Does smoke 1 1/2 to 2 ppd  Has tried patches which made him want to smoke more  Cannot have Wellbutrin secondary to seizure previously      HTN   Losartan hydrochlorothiazide 50/12.5  BP also similarly elevated at home  Amenable to increase    Tobacco use  -is now smoking 1 pack/day -referring to smoking cessation  -Amenable to lozenges    HLD  Patient with total cholesterol of 222 and elevated LDL  Crestor 10 mg Tolerating well    Elevated Creatinine  -Noted on lab work from first visit. Slightly decreased GFR      Shortness of breath   Patient is able to states he notices at times he is wheezing   Would like fill of albuterol CT indicates patient has a component of COPD    Depression/Insomnia  - occasional. Has been on medication in the past. Does not need medication at this time. NO SI Or HI  Stable on Trazodone 50 mg. States he takes half two to 3 days per week and whole on weekends.    Abdominal Pain/Constipation/History of 3 tubular adenomas (2017) descending colon  Started on Miralax-doing well  2017 history of 3 tubular adenomas.  Referred back to [facility] gastro      Erectile dysfunction  Has trouble maintaining erections. No chest pain or other alarm symptoms. Viagra 50 mg tablets prescribed,  patient is now reporting that he is having trouble using Viagra to schedule sexual activity   Is now on Cialis  ROS:      ROS      12 point review of systems conducted, negative except as stated in the history of present illness. See HPI for details.    History:     Past Medical History:   Diagnosis Date    GERD (gastroesophageal reflux disease)     Hyperlipidemia     Hypertension     Seasonal allergies     Not sure    Seizures         Past Surgical History:   Procedure Laterality Date    COLONOSCOPY      HERNIA REPAIR         Family History   Problem Relation Name Age of Onset    Cancer Mother Mother         Colon cancer    Diabetes Mother Mother     Hypertension Mother Mother     Cancer Father      Diabetes Maternal Grandmother Grandmother     Stroke Maternal Grandmother Grandmother         Social History     Tobacco Use    Smoking status: Every Day     Current packs/day: 1.50     Average packs/day: 1.5 packs/day for 15.0 years (22.5 ttl pk-yrs)     Types: Cigarettes     Passive exposure: Never    Smokeless tobacco: Never    Tobacco comments:     Sometimes 2 packs   Substance and Sexual Activity    Alcohol use: Yes     Alcohol/week: 12.0 standard drinks of alcohol     Types: 12 Cans of beer per week     Comment: not drinking x2 months    Drug use: Never    Sexual activity: Not Currently     Partners: Female     Birth control/protection: None       Current Outpatient Medications   Medication Instructions    albuterol (VENTOLIN HFA) 90 mcg/actuation inhaler 2 puffs, Inhalation, Every 6 hours PRN, Rescue    azelastine (ASTELIN) 137 mcg, Nasal, 2 times daily    cetirizine (ZYRTEC) 10 mg, Oral, Daily    fluticasone propionate (FLONASE) 50 mcg/actuation nasal spray 2 sprays, 2 times daily    losartan-hydrochlorothiazide 100-12.5 mg (HYZAAR) 100-12.5 mg Tab 1 tablet, Oral, Daily    nicotine polacrilex 2 mg, Oral, Every 2 hours PRN    pantoprazole (PROTONIX) 40 mg, Oral    rosuvastatin (CRESTOR) 10 MG tablet TAKE ONE TABLET BY MOUTH ONCE A DAY    tadalafiL (CIALIS) 5 mg, Oral, Daily PRN, May start with half tablet daily. Can increase to 1 tablet  "after a week if needed.    traZODone (DESYREL) 50 mg, Oral, Nightly        Review of patient's allergies indicates:  No Known Allergies    Objective:     Visit Vitals  BP (!) 145/89 (BP Location: Right arm, Patient Position: Sitting)   Pulse 75   Temp 97.7 °F (36.5 °C) (Oral)   Resp 20   Ht 5' 8" (1.727 m)   Wt 76.2 kg (167 lb 14.4 oz)   SpO2 100%   BMI 25.53 kg/m²       Physical Examination:     Physical Exam  Constitutional:       General: He is not in acute distress.     Appearance: Normal appearance. He is not ill-appearing or diaphoretic.   HENT:      Nose: No congestion.   Eyes:      Conjunctiva/sclera: Conjunctivae normal.      Pupils: Pupils are equal, round, and reactive to light.   Cardiovascular:      Rate and Rhythm: Normal rate and regular rhythm.      Heart sounds: No murmur heard.  Pulmonary:      Effort: Pulmonary effort is normal. No respiratory distress.      Breath sounds: Normal breath sounds. No wheezing.   Musculoskeletal:         General: No swelling, tenderness, deformity or signs of injury. Normal range of motion.      Cervical back: Normal range of motion.      Comments: Could not reproduce pain on exam. Negative empty can test.  Patient does have component of muscle tightness on left.   Skin:     General: Skin is warm and dry.      Coloration: Skin is not jaundiced.   Neurological:      General: No focal deficit present.      Mental Status: He is alert and oriented to person, place, and time. Mental status is at baseline.      Gait: Gait normal.         Lab Results:     Chemistry:  Lab Results   Component Value Date     10/22/2024    K 3.9 10/22/2024    BUN 12.1 10/22/2024    CREATININE 1.28 (H) 10/22/2024    EGFRNORACEVR >60 10/22/2024    GLUCOSE 96 10/22/2024    CALCIUM 10.4 (H) 10/22/2024    ALKPHOS 51 01/22/2024    LABPROT 7.9 (H) 01/22/2024    ALBUMIN 3.9 01/22/2024    BILIDIR 0.1 08/25/2021    IBILI 0.10 08/25/2021    AST 23 01/22/2024    ALT 24 01/22/2024    GAAMSDBD34BL 37.0 " 01/22/2024    TSH 0.900 01/22/2024    XNSJHG2GFCQ 1.02 01/22/2024    PSA 0.28 01/22/2024        Lab Results   Component Value Date    HGBA1C 5.6 01/22/2024        Hematology:  Lab Results   Component Value Date    WBC 11.84 (H) 10/22/2024    HGB 15.5 10/22/2024    HCT 47.5 10/22/2024     10/22/2024       Lipid Panel:  Lab Results   Component Value Date    CHOL 137 01/22/2024    HDL 30 (L) 01/22/2024    LDL 80.00 01/22/2024    TRIG 135 01/22/2024    TOTALCHOLEST 5 01/22/2024        Urine:  Lab Results   Component Value Date    APPEARANCEUA Clear 01/22/2024    SGUA 1.008 01/22/2024    PROTEINUA Negative 01/22/2024    KETONESUA Negative 01/22/2024    LEUKOCYTESUR Negative 01/22/2024    RBCUA 0-5 01/22/2024    WBCUA 0-5 01/22/2024    BACTERIA None Seen 01/22/2024    SQEPUA None Seen 01/22/2024    HYALINECASTS None Seen 01/22/2024        Assessment:          ICD-10-CM ICD-9-CM   1. Influenza vaccination ordered  Z78.9 V49.89   2. Smoker  F17.200 305.1   3. Elevated serum creatinine  R79.89 790.99   4. Chronic left shoulder pain  M25.512 719.41    G89.29 338.29   5. Chronic left hip pain  M25.552 719.45    G89.29 338.29   6. Primary hypertension  I10 401.9        Plan:     1. Influenza vaccination ordered  -     influenza (Flulaval, Fluzone, Fluarix) 45 mcg/0.5 mL IM vaccine (> or = 6 mo) 0.5 mL    2. Smoker  Assessment & Plan:  Spent 3 minutes discussing smoking cessation with patient and have ordered low-dose lung CT  Prescribed lozenges for patient    Orders:  -     CT Chest Lung Screening Low Dose; Future; Expected date: 10/22/2024  -     nicotine polacrilex 2 MG Lozg; Take 1 lozenge (2 mg total) by mouth every 2 (two) hours as needed (Smoking urge).  Dispense: 108 lozenge; Refill: 11    3. Elevated serum creatinine  Assessment & Plan:  Noted on labs  Repeating BMP today    Orders:  -     Basic Metabolic Panel; Future; Expected date: 10/22/2024  -     CBC Auto Differential    4. Chronic left shoulder pain  -      X-Ray Shoulder 2 or More Views Left; Future; Expected date: 10/22/2024  -     Ambulatory referral/consult to Orthopedics; Future; Expected date: 10/29/2024  -     Ambulatory referral/consult to Physical/Occupational Therapy; Future; Expected date: 10/29/2024    5. Chronic left hip pain  -     X-Ray Hip 2 or 3 views Left with Pelvis when performed; Future; Expected date: 10/22/2024  -     Ambulatory referral/consult to Physical/Occupational Therapy; Future; Expected date: 10/29/2024    6. Primary hypertension  Overview:        Assessment & Plan:  Not controlled at 45/89 also reviewed patient's home blood pressure reading which have many that are elevated   Increasing losartan hydrochlorothiazide to 100/12.5 daily    Orders:  -     losartan-hydrochlorothiazide 100-12.5 mg (HYZAAR) 100-12.5 mg Tab; Take 1 tablet by mouth once daily.  Dispense: 90 tablet; Refill: 3         Follow up in about 6 weeks (around 12/3/2024) for Hypertension, BP check.    No future appointments.     Regina Joseph MD

## 2024-10-22 NOTE — ASSESSMENT & PLAN NOTE
Not controlled at 45/89 also reviewed patient's home blood pressure reading which have many that are elevated   Increasing losartan hydrochlorothiazide to 100/12.5 daily

## 2024-11-12 ENCOUNTER — HOSPITAL ENCOUNTER (OUTPATIENT)
Dept: RADIOLOGY | Facility: HOSPITAL | Age: 59
Discharge: HOME | End: 2024-11-12
Attending: STUDENT IN AN ORGANIZED HEALTH CARE EDUCATION/TRAINING PROGRAM
Payer: COMMERCIAL

## 2024-11-12 ENCOUNTER — PATIENT MESSAGE (OUTPATIENT)
Dept: FAMILY MEDICINE | Facility: CLINIC | Age: 59
End: 2024-11-12
Payer: COMMERCIAL

## 2024-11-12 DIAGNOSIS — G89.29 CHRONIC LEFT SHOULDER PAIN: ICD-10-CM

## 2024-11-12 DIAGNOSIS — M25.552 CHRONIC LEFT HIP PAIN: ICD-10-CM

## 2024-11-12 DIAGNOSIS — Z12.2 SCREENING FOR RESPIRATORY ORGAN CANCER: ICD-10-CM

## 2024-11-12 DIAGNOSIS — M25.512 CHRONIC LEFT SHOULDER PAIN: ICD-10-CM

## 2024-11-12 DIAGNOSIS — G89.29 CHRONIC LEFT HIP PAIN: ICD-10-CM

## 2024-11-12 PROCEDURE — 71271 CT THORAX LUNG CANCER SCR C-: CPT | Mod: TC

## 2024-11-12 PROCEDURE — 73502 X-RAY EXAM HIP UNI 2-3 VIEWS: CPT | Mod: TC,LT

## 2024-11-12 PROCEDURE — 73030 X-RAY EXAM OF SHOULDER: CPT | Mod: TC,LT

## 2024-11-13 ENCOUNTER — PATIENT MESSAGE (OUTPATIENT)
Dept: FAMILY MEDICINE | Facility: CLINIC | Age: 59
End: 2024-11-13
Payer: COMMERCIAL

## 2024-12-01 ENCOUNTER — PATIENT MESSAGE (OUTPATIENT)
Dept: ADMINISTRATIVE | Facility: HOSPITAL | Age: 59
End: 2024-12-01
Payer: COMMERCIAL

## 2024-12-02 ENCOUNTER — PATIENT OUTREACH (OUTPATIENT)
Facility: CLINIC | Age: 59
End: 2024-12-02
Payer: COMMERCIAL

## 2024-12-02 NOTE — PROGRESS NOTES
Health Maintenance Topic(s) Outreach Outcomes & Actions Taken:    Colorectal Cancer Screening - Outreach Outcomes & Actions Taken  : Colonoscopy Case Request / Referral / Home Test Order Placed and referral sent to Dr. Moy. Messages sent to patient to contact clinic and schedule appt.        Additional Notes:

## 2025-01-26 ENCOUNTER — PATIENT MESSAGE (OUTPATIENT)
Dept: FAMILY MEDICINE | Facility: CLINIC | Age: 60
End: 2025-01-26
Payer: COMMERCIAL

## 2025-01-28 ENCOUNTER — TELEPHONE (OUTPATIENT)
Dept: FAMILY MEDICINE | Facility: CLINIC | Age: 60
End: 2025-01-28
Payer: COMMERCIAL

## 2025-01-28 NOTE — TELEPHONE ENCOUNTER
----- Message from Tisha sent at 1/28/2025  9:05 AM CST -----  Patient is asking for a call back in regards to his medications.

## 2025-02-05 ENCOUNTER — PATIENT OUTREACH (OUTPATIENT)
Facility: CLINIC | Age: 60
End: 2025-02-05
Payer: COMMERCIAL

## 2025-02-05 ENCOUNTER — PATIENT MESSAGE (OUTPATIENT)
Dept: ADMINISTRATIVE | Facility: HOSPITAL | Age: 60
End: 2025-02-05
Payer: COMMERCIAL

## 2025-02-05 NOTE — PROGRESS NOTES
Health Maintenance Topic(s) Outreach Outcomes & Actions Taken:    Colorectal Cancer Screening - Outreach Outcomes & Actions Taken  : Colonoscopy Case Request / Referral / Home Test Order Placed and referral sent by pcp to [facility]. Informed patient about referral and instructed to contact for appt.      Additional Notes:

## 2025-02-21 ENCOUNTER — TELEPHONE (OUTPATIENT)
Dept: PHARMACY | Facility: CLINIC | Age: 60
End: 2025-02-21
Payer: COMMERCIAL

## 2025-02-21 NOTE — TELEPHONE ENCOUNTER
[facility]/Population Health Chart Review & Patient Outreach Details For Medication Adherence Project    Reason for Outreach Encounter: 3rd Party payor non-compliance report ([employer], [employer], [employer], etc)  2.  Patient Outreach Method: Reviewed patient chart   3.   Medication in question:    Hyperlipidemia Medications              rosuvastatin (CRESTOR) 10 MG tablet TAKE ONE TABLET BY MOUTH ONCE A DAY                  rosuvastatin  last filled  1/28 for 90 day supply      4.  Reviewed and or Updates Made To: Patient Chart  5. Outreach Outcomes and/or actions taken: Patient filled medication and is on track to be adherent  Additional Notes:

## 2025-02-26 ENCOUNTER — OFFICE VISIT (OUTPATIENT)
Dept: FAMILY MEDICINE | Facility: CLINIC | Age: 60
End: 2025-02-26
Payer: COMMERCIAL

## 2025-02-26 VITALS
TEMPERATURE: 98 F | HEART RATE: 87 BPM | WEIGHT: 167 LBS | BODY MASS INDEX: 25.31 KG/M2 | OXYGEN SATURATION: 95 % | HEIGHT: 68 IN | SYSTOLIC BLOOD PRESSURE: 119 MMHG | DIASTOLIC BLOOD PRESSURE: 73 MMHG

## 2025-02-26 DIAGNOSIS — R79.89 ELEVATED SERUM CREATININE: ICD-10-CM

## 2025-02-26 DIAGNOSIS — I10 PRIMARY HYPERTENSION: Primary | ICD-10-CM

## 2025-02-26 DIAGNOSIS — E78.49 OTHER HYPERLIPIDEMIA: ICD-10-CM

## 2025-02-26 DIAGNOSIS — R09.81 NASAL CONGESTION: ICD-10-CM

## 2025-02-26 DIAGNOSIS — M25.512 LEFT SHOULDER PAIN, UNSPECIFIED CHRONICITY: ICD-10-CM

## 2025-02-26 DIAGNOSIS — M75.22 BICEPS TENDINITIS OF LEFT UPPER EXTREMITY: ICD-10-CM

## 2025-02-26 DIAGNOSIS — F17.200 SMOKER: ICD-10-CM

## 2025-02-26 LAB
ALBUMIN SERPL-MCNC: 4.1 G/DL (ref 3.4–4.8)
ALBUMIN/GLOB SERPL: 1 RATIO (ref 1.1–2)
ALP SERPL-CCNC: 52 UNIT/L (ref 40–150)
ALT SERPL-CCNC: 27 UNIT/L (ref 0–55)
ANION GAP SERPL CALC-SCNC: 8 MEQ/L
AST SERPL-CCNC: 25 UNIT/L (ref 5–34)
BASOPHILS # BLD AUTO: 0.08 X10(3)/MCL
BASOPHILS NFR BLD AUTO: 0.6 %
BILIRUB SERPL-MCNC: 0.2 MG/DL
BUN SERPL-MCNC: 10.6 MG/DL (ref 8.4–25.7)
CALCIUM SERPL-MCNC: 10 MG/DL (ref 8.8–10)
CHLORIDE SERPL-SCNC: 102 MMOL/L (ref 98–107)
CO2 SERPL-SCNC: 28 MMOL/L (ref 23–31)
CREAT SERPL-MCNC: 1.14 MG/DL (ref 0.72–1.25)
CREAT/UREA NIT SERPL: 9
EOSINOPHIL # BLD AUTO: 0.18 X10(3)/MCL (ref 0–0.9)
EOSINOPHIL NFR BLD AUTO: 1.4 %
ERYTHROCYTE [DISTWIDTH] IN BLOOD BY AUTOMATED COUNT: 15.3 % (ref 11.5–17)
GFR SERPLBLD CREATININE-BSD FMLA CKD-EPI: >60 ML/MIN/1.73/M2
GLOBULIN SER-MCNC: 4.2 GM/DL (ref 2.4–3.5)
GLUCOSE SERPL-MCNC: 85 MG/DL (ref 82–115)
HCT VFR BLD AUTO: 46 % (ref 42–52)
HGB BLD-MCNC: 15.6 G/DL (ref 14–18)
IMM GRANULOCYTES # BLD AUTO: 0.02 X10(3)/MCL (ref 0–0.04)
IMM GRANULOCYTES NFR BLD AUTO: 0.2 %
LYMPHOCYTES # BLD AUTO: 6.15 X10(3)/MCL (ref 0.6–4.6)
LYMPHOCYTES NFR BLD AUTO: 48.1 %
MCH RBC QN AUTO: 28.2 PG (ref 27–31)
MCHC RBC AUTO-ENTMCNC: 33.9 G/DL (ref 33–36)
MCV RBC AUTO: 83 FL (ref 80–94)
MONOCYTES # BLD AUTO: 0.98 X10(3)/MCL (ref 0.1–1.3)
MONOCYTES NFR BLD AUTO: 7.7 %
NEUTROPHILS # BLD AUTO: 5.37 X10(3)/MCL (ref 2.1–9.2)
NEUTROPHILS NFR BLD AUTO: 42 %
NRBC BLD AUTO-RTO: 0 %
PLATELET # BLD AUTO: 344 X10(3)/MCL (ref 130–400)
PMV BLD AUTO: 10.6 FL (ref 7.4–10.4)
POTASSIUM SERPL-SCNC: 3.9 MMOL/L (ref 3.5–5.1)
PROT SERPL-MCNC: 8.3 GM/DL (ref 5.8–7.6)
RBC # BLD AUTO: 5.54 X10(6)/MCL (ref 4.7–6.1)
SODIUM SERPL-SCNC: 138 MMOL/L (ref 136–145)
WBC # BLD AUTO: 12.78 X10(3)/MCL (ref 4.5–11.5)

## 2025-02-26 PROCEDURE — 80053 COMPREHEN METABOLIC PANEL: CPT

## 2025-02-26 PROCEDURE — 36415 COLL VENOUS BLD VENIPUNCTURE: CPT

## 2025-02-26 PROCEDURE — 1159F MED LIST DOCD IN RCRD: CPT | Mod: CPTII,,,

## 2025-02-26 PROCEDURE — 99214 OFFICE O/P EST MOD 30 MIN: CPT | Mod: S$PBB,,,

## 2025-02-26 PROCEDURE — 3008F BODY MASS INDEX DOCD: CPT | Mod: CPTII,,,

## 2025-02-26 PROCEDURE — 99215 OFFICE O/P EST HI 40 MIN: CPT | Mod: PBBFAC,PN

## 2025-02-26 PROCEDURE — 1160F RVW MEDS BY RX/DR IN RCRD: CPT | Mod: CPTII,,,

## 2025-02-26 PROCEDURE — 3078F DIAST BP <80 MM HG: CPT | Mod: CPTII,,,

## 2025-02-26 PROCEDURE — 3074F SYST BP LT 130 MM HG: CPT | Mod: CPTII,,,

## 2025-02-26 PROCEDURE — 85025 COMPLETE CBC W/AUTO DIFF WBC: CPT

## 2025-02-26 RX ORDER — CETIRIZINE HYDROCHLORIDE 10 MG/1
10 TABLET ORAL DAILY
Qty: 90 TABLET | Refills: 3 | Status: SHIPPED | OUTPATIENT
Start: 2025-02-26 | End: 2026-02-26

## 2025-02-26 RX ORDER — DM/P-EPHED/ACETAMINOPH/DOXYLAM 30-7.5/3
2 LIQUID (ML) ORAL
Qty: 108 LOZENGE | Refills: 11 | Status: SHIPPED | OUTPATIENT
Start: 2025-02-26

## 2025-02-26 NOTE — ASSESSMENT & PLAN NOTE
Physical exam consistent with biceps tendinitis.  Patient was advised to rest from weight lifting, apply ice.  If kidney function has normalized, we will prescribed meloxicam.  We will refer to ortho if not improved with conservative measures.

## 2025-02-26 NOTE — ASSESSMENT & PLAN NOTE
Well controlled.  Continue Crestor 10 mg daily  Lab Results   Component Value Date    LDL 80.00 01/22/2024    TRIG 135 01/22/2024    HDL 30 (L) 01/22/2024    TOTALCHOLEST 5 01/22/2024     Educated on importance of dietary modifications. Follow a low cholesterol, low saturated fat diet with less that 200mg of cholesterol a day.  Avoid fried foods and high saturated fats (high saturated fats less than 7% of calories).  Increase dietary fiber.  Regular exercise can reduce LDL and raise HDL. Stressed importance of physical activity 5 times per week for 30 minutes per day.

## 2025-02-26 NOTE — PROGRESS NOTES
[facility]  Radha Gonzalez MD    Patient ID: [id number]     Chief Complaint: Follow-up (Patient here for follow up for lab results. No other problems per patient. )      HPI:     Harjit Handley is a 59 y.o. male here today for follow up of chronic conditions    Hypertension  Patient is on losartan-hydrochlorothiazide 100-12.5 mg.  Blood pressure 119/73 in clinic today.    Hyperlipidemia  Compliant with 10 mg Crestor daily    Patient reports intermittent left shoulder pain for years.  He does weight lifting regularly.  X-ray of the shoulder in November showed narrowing of the acromioclavicular joint and minimal degenerative changes of the glenohumeral joint.  No improvement with his wife's diclofenac gel.    Past Medical History:   Diagnosis Date    GERD (gastroesophageal reflux disease)     Hyperlipidemia     Hypertension     Seasonal allergies     Not sure    Seizures         Past Surgical History:   Procedure Laterality Date    COLONOSCOPY      HERNIA REPAIR          Social History     Tobacco Use    Smoking status: Every Day     Current packs/day: 1.50     Average packs/day: 1.5 packs/day for 15.0 years (22.5 ttl pk-yrs)     Types: Cigarettes     Passive exposure: Never    Smokeless tobacco: Never    Tobacco comments:     Sometimes 2 packs   Substance and Sexual Activity    Alcohol use: Yes     Alcohol/week: 12.0 standard drinks of alcohol     Types: 12 Cans of beer per week     Comment: not drinking x2 months    Drug use: Never    Sexual activity: Not Currently     Partners: Female     Birth control/protection: None        Current Outpatient Medications   Medication Instructions    albuterol (VENTOLIN HFA) 90 mcg/actuation inhaler 2 puffs, Inhalation, Every 6 hours PRN, Rescue    azelastine (ASTELIN) 137 mcg, Nasal, 2 times daily    cetirizine (ZYRTEC) 10 mg, Oral, Daily    fluticasone propionate (FLONASE) 50 mcg/actuation nasal spray 2 sprays, 2 times daily    losartan-hydrochlorothiazide  "100-12.5 mg (HYZAAR) 100-12.5 mg Tab 1 tablet, Oral, Daily    nicotine polacrilex 2 mg, Oral, Every 2 hours PRN    pantoprazole (PROTONIX) 40 mg, Oral    rosuvastatin (CRESTOR) 10 MG tablet TAKE ONE TABLET BY MOUTH ONCE A DAY    tadalafiL (CIALIS) 5 mg, Oral, Daily PRN, May start with half tablet daily. Can increase to 1 tablet after a week if needed.    traZODone (DESYREL) 50 mg, Oral, Nightly       Review of patient's allergies indicates:  No Known Allergies     Patient Care Team:  Regina Joseph MD as PCP - General (Family Medicine)  Pooja Harper MD as Consulting Physician (Gastroenterology)  Indu Howard LPN as Care Coordinator     Subjective:     Review of Systems    12 point review of systems conducted, negative except as stated in the history of present illness. See HPI for details.    Objective:     Visit Vitals  /73 (BP Location: Left arm, Patient Position: Sitting)   Pulse 87   Temp 98.3 °F (36.8 °C) (Oral)   Ht 5' 8" (1.727 m)   Wt 75.8 kg (167 lb)   SpO2 95%   BMI 25.39 kg/m²       Physical Exam  Vitals and nursing note reviewed.   Constitutional:       General: He is not in acute distress.     Appearance: Normal appearance. He is not ill-appearing or diaphoretic.   HENT:      Head: Normocephalic and atraumatic.      Mouth/Throat:      Mouth: Mucous membranes are moist.      Pharynx: Oropharynx is clear.   Eyes:      Extraocular Movements: Extraocular movements intact.      Conjunctiva/sclera: Conjunctivae normal.   Cardiovascular:      Rate and Rhythm: Normal rate and regular rhythm.      Pulses: Normal pulses.      Heart sounds: No murmur heard.  Pulmonary:      Effort: Pulmonary effort is normal. No respiratory distress.      Breath sounds: Normal breath sounds. No wheezing, rhonchi or rales.   Musculoskeletal:      Right lower leg: No edema.      Left lower leg: No edema.      Comments: Significant pain with palpation of the biceps tendon in the bicipital groove with external " rotation of the shoulder.  Normal passive and active range of motion of the shoulder   Skin:     General: Skin is warm and dry.   Neurological:      General: No focal deficit present.      Mental Status: He is alert and oriented to person, place, and time. Mental status is at baseline.   Psychiatric:         Mood and Affect: Mood normal.         Labs Reviewed:     Chemistry:  Lab Results   Component Value Date     10/22/2024    K 3.9 10/22/2024    BUN 12.1 10/22/2024    CREATININE 1.28 (H) 10/22/2024    EGFRNORACEVR >60 10/22/2024    GLUCOSE 96 10/22/2024    CALCIUM 10.4 (H) 10/22/2024    ALKPHOS 51 01/22/2024    LABPROT 7.9 (H) 01/22/2024    ALBUMIN 3.9 01/22/2024    BILIDIR 0.1 08/25/2021    IBILI 0.10 08/25/2021    AST 23 01/22/2024    ALT 24 01/22/2024    WZSNPCYS43WQ 37.0 01/22/2024    TSH 0.900 01/22/2024    UGSMKT6RJUM 1.02 01/22/2024    PSA 0.28 01/22/2024        Lab Results   Component Value Date    HGBA1C 5.6 01/22/2024        Hematology:  Lab Results   Component Value Date    WBC 11.84 (H) 10/22/2024    HGB 15.5 10/22/2024    HCT 47.5 10/22/2024     10/22/2024       Lipid Panel:  Lab Results   Component Value Date    CHOL 137 01/22/2024    HDL 30 (L) 01/22/2024    LDL 80.00 01/22/2024    TRIG 135 01/22/2024    TOTALCHOLEST 5 01/22/2024        Urine:  Lab Results   Component Value Date    APPEARANCEUA Clear 01/22/2024    SGUA 1.008 01/22/2024    PROTEINUA Negative 01/22/2024    KETONESUA Negative 01/22/2024    LEUKOCYTESUR Negative 01/22/2024    RBCUA 0-5 01/22/2024    WBCUA 0-5 01/22/2024    BACTERIA None Seen 01/22/2024    SQEPUA None Seen 01/22/2024    HYALINECASTS None Seen 01/22/2024        Assessment:       ICD-10-CM ICD-9-CM   1. Primary hypertension  I10 401.9   2. Other hyperlipidemia  E78.49 272.4   3. Nasal congestion  R09.81 478.19   4. Smoker  F17.200 305.1   5. Biceps tendinitis of left upper extremity  M75.22 726.12   6. Left shoulder pain, unspecified chronicity  M25.512 719.41    7. Elevated serum creatinine  R79.89 790.99        Plan:     1. Primary hypertension  Overview:        Assessment & Plan:  Well-controlled.  Continue below regimen  Hypertension Medications              losartan-hydrochlorothiazide 100-12.5 mg (HYZAAR) 100-12.5 mg Tab Take 1 tablet by mouth once daily.          AHA/ACC goal <130/80. JNC8 goal <140/90 (all ages if DM or CKD OR <60), <150/90 (>60 w/o CKD or DM)  Low Sodium Diet (DASH Diet - Less than 2 grams of sodium per day).  Monitor blood pressure daily and log. Report consistent numbers greater than 140/90.  Maintain healthy weight with goal BMI <30. Exercise 30 minutes per day, 5 days per week.        Orders:  -     CBC Auto Differential  -     Comprehensive Metabolic Panel    2. Other hyperlipidemia  Assessment & Plan:  Well controlled.  Continue Crestor 10 mg daily  Lab Results   Component Value Date    LDL 80.00 01/22/2024    TRIG 135 01/22/2024    HDL 30 (L) 01/22/2024    TOTALCHOLEST 5 01/22/2024     Educated on importance of dietary modifications. Follow a low cholesterol, low saturated fat diet with less that 200mg of cholesterol a day.  Avoid fried foods and high saturated fats (high saturated fats less than 7% of calories).  Increase dietary fiber.  Regular exercise can reduce LDL and raise HDL. Stressed importance of physical activity 5 times per week for 30 minutes per day.         3. Nasal congestion  -     cetirizine (ZYRTEC) 10 MG tablet; Take 1 tablet (10 mg total) by mouth once daily.  Dispense: 90 tablet; Refill: 3    4. Smoker  -     nicotine polacrilex 2 MG Lozg; Take 1 lozenge (2 mg total) by mouth every 2 (two) hours as needed (Smoking urge).  Dispense: 108 lozenge; Refill: 11    5. Biceps tendinitis of left upper extremity  Assessment & Plan:  Physical exam consistent with biceps tendinitis.  Patient was advised to rest from weight lifting, apply ice.  If kidney function has normalized, we will prescribed meloxicam.  We will refer to  ortho if not improved with conservative measures.    Orders:  -     MRI Shoulder W WO Contrast Left; Future; Expected date: 02/26/2025    6. Left shoulder pain, unspecified chronicity  -     MRI Shoulder W WO Contrast Left; Future; Expected date: 02/26/2025    7. Elevated serum creatinine  Assessment & Plan:  Repeat CMP today           Follow up in about 3 months (around 5/26/2025). In addition to their scheduled follow up, the patient has also been instructed to follow up on as needed basis.     Future Appointments   Date Time Provider Department Center   5/27/2025  2:00 PM Ramonita Downey FNP [facility]        Radha Gonzalez MD

## 2025-02-26 NOTE — ASSESSMENT & PLAN NOTE
Well-controlled.  Continue below regimen  Hypertension Medications              losartan-hydrochlorothiazide 100-12.5 mg (HYZAAR) 100-12.5 mg Tab Take 1 tablet by mouth once daily.          AHA/ACC goal <130/80. JNC8 goal <140/90 (all ages if DM or CKD OR <60), <150/90 (>60 w/o CKD or DM)  Low Sodium Diet (DASH Diet - Less than 2 grams of sodium per day).  Monitor blood pressure daily and log. Report consistent numbers greater than 140/90.  Maintain healthy weight with goal BMI <30. Exercise 30 minutes per day, 5 days per week.

## 2025-02-27 ENCOUNTER — RESULTS FOLLOW-UP (OUTPATIENT)
Dept: FAMILY MEDICINE | Facility: CLINIC | Age: 60
End: 2025-02-27
Payer: COMMERCIAL

## 2025-02-27 DIAGNOSIS — D72.820 LYMPHOCYTOSIS: Primary | ICD-10-CM

## 2025-02-28 ENCOUNTER — TELEPHONE (OUTPATIENT)
Dept: FAMILY MEDICINE | Facility: CLINIC | Age: 60
End: 2025-02-28
Payer: COMMERCIAL

## 2025-02-28 NOTE — TELEPHONE ENCOUNTER
----- Message from Radha Gonzalez MD sent at 2/27/2025  4:34 PM CST -----  Can we call and see if a peripheral blood smear can be added on to his labs? If not, the patient will need a redraw. It is to evaluate his elevated white blood cell count that he has had for 2 years.   The blood smear is ordered. Thank you   ----- Message -----  From: Lab, Background User  Sent: 2/26/2025   5:36 PM CST  To: Radha Gonzalez MD

## 2025-02-28 NOTE — TELEPHONE ENCOUNTER
Called Lab. Spoke with Gracie. Confirmed that this can be order. Informed her the order is in the system. She confirmed. Will get this completed.

## 2025-02-28 NOTE — TELEPHONE ENCOUNTER
Amanda sent me a message and informed me that the blood specimen is past its stability window to complete the blood smear.     LVM notifying that the patient will need to get this test done and report to the lab to have it drawn.

## 2025-07-16 ENCOUNTER — OFFICE VISIT (OUTPATIENT)
Dept: FAMILY MEDICINE | Facility: CLINIC | Age: 60
End: 2025-07-16
Payer: COMMERCIAL

## 2025-07-16 VITALS
OXYGEN SATURATION: 98 % | DIASTOLIC BLOOD PRESSURE: 85 MMHG | SYSTOLIC BLOOD PRESSURE: 165 MMHG | BODY MASS INDEX: 25.35 KG/M2 | TEMPERATURE: 99 F | RESPIRATION RATE: 15 BRPM | WEIGHT: 166.69 LBS | HEART RATE: 90 BPM

## 2025-07-16 DIAGNOSIS — Z12.12 SCREENING FOR COLORECTAL CANCER: ICD-10-CM

## 2025-07-16 DIAGNOSIS — E78.5 HYPERLIPIDEMIA, UNSPECIFIED HYPERLIPIDEMIA TYPE: ICD-10-CM

## 2025-07-16 DIAGNOSIS — D72.820 LYMPHOCYTOSIS: Primary | ICD-10-CM

## 2025-07-16 DIAGNOSIS — J30.9 ALLERGIC RHINITIS, UNSPECIFIED SEASONALITY, UNSPECIFIED TRIGGER: ICD-10-CM

## 2025-07-16 DIAGNOSIS — I10 PRIMARY HYPERTENSION: ICD-10-CM

## 2025-07-16 DIAGNOSIS — Z79.899 OTHER LONG TERM (CURRENT) DRUG THERAPY: ICD-10-CM

## 2025-07-16 DIAGNOSIS — Z23 ENCOUNTER FOR IMMUNIZATION: ICD-10-CM

## 2025-07-16 DIAGNOSIS — Z12.11 SCREENING FOR COLORECTAL CANCER: ICD-10-CM

## 2025-07-16 DIAGNOSIS — M75.22 BICEPS TENDINITIS OF LEFT UPPER EXTREMITY: ICD-10-CM

## 2025-07-16 DIAGNOSIS — Z12.5 PROSTATE CANCER SCREENING: ICD-10-CM

## 2025-07-16 PROCEDURE — 99214 OFFICE O/P EST MOD 30 MIN: CPT | Mod: 25,,,

## 2025-07-16 PROCEDURE — 90471 IMMUNIZATION ADMIN: CPT | Mod: ,,,

## 2025-07-16 PROCEDURE — 3008F BODY MASS INDEX DOCD: CPT | Mod: CPTII,,,

## 2025-07-16 PROCEDURE — 1160F RVW MEDS BY RX/DR IN RCRD: CPT | Mod: CPTII,,,

## 2025-07-16 PROCEDURE — 90677 PCV20 VACCINE IM: CPT | Mod: ,,,

## 2025-07-16 PROCEDURE — 3077F SYST BP >= 140 MM HG: CPT | Mod: CPTII,,,

## 2025-07-16 PROCEDURE — 1159F MED LIST DOCD IN RCRD: CPT | Mod: CPTII,,,

## 2025-07-16 PROCEDURE — 3079F DIAST BP 80-89 MM HG: CPT | Mod: CPTII,,,

## 2025-07-16 RX ORDER — ACETAMINOPHEN 500 MG
1 TABLET ORAL ONCE
Qty: 1 EACH | Refills: 0 | Status: SHIPPED | OUTPATIENT
Start: 2025-07-16 | End: 2025-07-16

## 2025-07-16 RX ORDER — DICLOFENAC SODIUM 10 MG/G
2 GEL TOPICAL 4 TIMES DAILY
Qty: 50 G | Refills: 3 | Status: SHIPPED | OUTPATIENT
Start: 2025-07-16

## 2025-07-16 RX ORDER — MONTELUKAST SODIUM 10 MG/1
10 TABLET ORAL NIGHTLY
Qty: 30 TABLET | Refills: 0 | Status: SHIPPED | OUTPATIENT
Start: 2025-07-16 | End: 2025-08-15

## 2025-07-16 NOTE — ASSESSMENT & PLAN NOTE
Blood pressure 149/83 in clinic today.  Previously well-controlled.  Continue current regimen for now.  We will order new blood pressure cuff to have patient check blood pressures at home and bring log to next visit.  Hypertension Medications              losartan-hydrochlorothiazide 100-12.5 mg (HYZAAR) 100-12.5 mg Tab Take 1 tablet by mouth once daily.          AHA/ACC goal <130/80. JNC8 goal <140/90 (all ages if DM or CKD OR <60), <150/90 (>60 w/o CKD or DM)  Low Sodium Diet (DASH Diet - Less than 2 grams of sodium per day).  Monitor blood pressure daily and log. Report consistent numbers greater than 140/90.  Maintain healthy weight with goal BMI <30. Exercise 30 minutes per day, 5 days per week.

## 2025-07-16 NOTE — ASSESSMENT & PLAN NOTE
Patient with a long history of mild lymphocytosis.  He is asymptomatic.  Check peripheral blood smear.

## 2025-07-16 NOTE — ASSESSMENT & PLAN NOTE
Physical exam consistent with biceps tendinitis.  Symptoms much improved from last visit. Voltaren gel is working well.  Continue using this as needed.

## 2025-07-16 NOTE — PROGRESS NOTES
[facility]INE [facility]  Radha Gonzalez MD    Patient ID: [id number]     Chief Complaint: Follow-up      HPI:     Harjit Handley is a 59 y.o. male here today for follow up of chronic conditions.  Patient is a previous patient of Dr. Regina Joseph.    Hypertension  Patient is on losartan-hydrochlorothiazide 100-12.5 mg.  Blood pressure 149/85 in clinic today.     Hyperlipidemia  Compliant with 10 mg Crestor daily    Patient has a history of likely biceps tendinitis, diagnosed at last visit.  An MRI was ordered but never completed.  It only bothers him at night now.  Voltaren gel works well.    Past Medical History:   Diagnosis Date    GERD (gastroesophageal reflux disease)     Hyperlipidemia     Hypertension     Seasonal allergies     Not sure    Seizures         Past Surgical History:   Procedure Laterality Date    COLONOSCOPY      HERNIA REPAIR          Social History     Tobacco Use    Smoking status: Every Day     Current packs/day: 1.50     Average packs/day: 1.6 packs/day for 18.8 years (30.0 ttl pk-yrs)     Types: Cigarettes, Vaping with nicotine     Passive exposure: Never    Smokeless tobacco: Never    Tobacco comments:     Currently a Smoker - Classes didnt work with my work schedule and 2asntvable to keep missing work.   Substance and Sexual Activity    Alcohol use: Not Currently     Alcohol/week: 12.0 standard drinks of alcohol     Comment: Usually drink beer when I drink    Drug use: Never    Sexual activity: Not Currently     Partners: Female     Birth control/protection: None     Comment: Due to ED issues        Current Outpatient Medications   Medication Instructions    albuterol (VENTOLIN HFA) 90 mcg/actuation inhaler 2 puffs, Inhalation, Every 6 hours PRN, Rescue    azelastine (ASTELIN) 137 mcg, Nasal, 2 times daily    blood pressure monitor Kit 1 each, Misc.(Non-Drug; Combo Route), Once    cetirizine (ZYRTEC) 10 mg, Oral, Daily    diclofenac sodium (VOLTAREN ARTHRITIS PAIN) 2 g, Topical  (Top), 4 times daily    fluticasone propionate (FLONASE) 50 mcg/actuation nasal spray 2 sprays, 2 times daily    losartan-hydrochlorothiazide 100-12.5 mg (HYZAAR) 100-12.5 mg Tab 1 tablet, Oral, Daily    montelukast (SINGULAIR) 10 mg, Oral, Nightly    nicotine polacrilex 2 mg, Oral, Every 2 hours PRN    pantoprazole (PROTONIX) 40 mg, Oral    rosuvastatin (CRESTOR) 10 MG tablet TAKE ONE TABLET BY MOUTH ONCE A DAY    tadalafiL (CIALIS) 5 mg, Oral, Daily PRN, May start with half tablet daily. Can increase to 1 tablet after a week if needed.    traZODone (DESYREL) 50 mg, Oral, Nightly       Review of patient's allergies indicates:  No Known Allergies     Patient Care Team:  Regina Joseph MD (Inactive) as PCP - General (Family Medicine)  Pooja Harper MD as Consulting Physician (Gastroenterology)  Indu Howard LPN as Care Coordinator     Subjective:     Review of Systems    12 point review of systems conducted, negative except as stated in the history of present illness. See HPI for details.    Objective:     Visit Vitals  BP (!) 165/85 (Patient Position: Sitting)   Pulse 90   Temp 98.5 °F (36.9 °C) (Oral)   Resp 15   Wt 75.6 kg (166 lb 11.2 oz)   SpO2 98%   BMI 25.35 kg/m²       Physical Exam  Vitals and nursing note reviewed.   Constitutional:       General: He is not in acute distress.     Appearance: Normal appearance. He is not ill-appearing or diaphoretic.   HENT:      Head: Normocephalic and atraumatic.      Mouth/Throat:      Mouth: Mucous membranes are moist.      Pharynx: Oropharynx is clear.   Eyes:      Extraocular Movements: Extraocular movements intact.      Conjunctiva/sclera: Conjunctivae normal.   Cardiovascular:      Rate and Rhythm: Normal rate and regular rhythm.      Pulses: Normal pulses.      Heart sounds: No murmur heard.  Pulmonary:      Effort: Pulmonary effort is normal. No respiratory distress.      Breath sounds: Normal breath sounds. No wheezing, rhonchi or rales.    Musculoskeletal:      Right lower leg: No edema.      Left lower leg: No edema.   Skin:     General: Skin is warm and dry.   Neurological:      General: No focal deficit present.      Mental Status: He is alert and oriented to person, place, and time. Mental status is at baseline.   Psychiatric:         Mood and Affect: Mood normal.         Labs Reviewed:     Chemistry:  Lab Results   Component Value Date     02/26/2025    K 3.9 02/26/2025    BUN 10.6 02/26/2025    CREATININE 1.14 02/26/2025    EGFRNORACEVR >60 02/26/2025    CALCIUM 10.0 02/26/2025    ALKPHOS 52 02/26/2025    ALBUMIN 4.1 02/26/2025    BILIDIR 0.1 08/25/2021    IBILI 0.10 08/25/2021    AST 25 02/26/2025    ALT 27 02/26/2025    NKGZGBBM51DH 37.0 01/22/2024    TSH 0.900 01/22/2024    JHUQSS1BNBP 1.02 01/22/2024    PSA 0.28 01/22/2024        Lab Results   Component Value Date    HGBA1C 5.6 01/22/2024        Hematology:  Lab Results   Component Value Date    WBC 12.78 (H) 02/26/2025    HGB 15.6 02/26/2025    HCT 46.0 02/26/2025     02/26/2025       Lipid Panel:  Lab Results   Component Value Date    CHOL 137 01/22/2024    HDL 30 (L) 01/22/2024    LDL 80.00 01/22/2024    TRIG 135 01/22/2024    TOTALCHOLEST 5 01/22/2024        Urine:  Lab Results   Component Value Date    APPEARANCEUA Clear 01/22/2024    SGUA 1.008 01/22/2024    PROTEINUA Negative 01/22/2024    KETONESUA Negative 01/22/2024    LEUKOCYTESUR Negative 01/22/2024    RBCUA 0-5 01/22/2024    WBCUA 0-5 01/22/2024    BACTERIA None Seen 01/22/2024    SQEPUA None Seen 01/22/2024    HYALINECASTS None Seen 01/22/2024        Assessment:       ICD-10-CM ICD-9-CM   1. Lymphocytosis  D72.820 288.61   2. Primary hypertension  I10 401.9   3. Hyperlipidemia, unspecified hyperlipidemia type  E78.5 272.4   4. Prostate cancer screening  Z12.5 V76.44   5. Other long term (current) drug therapy  Z79.899 V58.69   6. Screening for colorectal cancer  Z12.11 V76.51    Z12.12 V76.41   7. Encounter for  immunization  Z23 V03.89   8. Biceps tendinitis of left upper extremity  M75.22 726.12   9. Allergic rhinitis, unspecified seasonality, unspecified trigger  J30.9 477.9        Plan:     1. Lymphocytosis  Assessment & Plan:  Patient with a long history of mild lymphocytosis.  He is asymptomatic.  Check peripheral blood smear.    Orders:  -     Path Review, Peripheral Smear; Future; Expected date: 07/16/2025    2. Primary hypertension  Overview:        Assessment & Plan:  Blood pressure 149/83 in clinic today.  Previously well-controlled.  Continue current regimen for now.  We will order new blood pressure cuff to have patient check blood pressures at home and bring log to next visit.  Hypertension Medications              losartan-hydrochlorothiazide 100-12.5 mg (HYZAAR) 100-12.5 mg Tab Take 1 tablet by mouth once daily.          AHA/ACC goal <130/80. JNC8 goal <140/90 (all ages if DM or CKD OR <60), <150/90 (>60 w/o CKD or DM)  Low Sodium Diet (DASH Diet - Less than 2 grams of sodium per day).  Monitor blood pressure daily and log. Report consistent numbers greater than 140/90.  Maintain healthy weight with goal BMI <30. Exercise 30 minutes per day, 5 days per week.        Orders:  -     CBC Auto Differential; Future; Expected date: 07/16/2025  -     Comprehensive Metabolic Panel; Future; Expected date: 07/16/2025  -     TSH; Future; Expected date: 07/16/2025  -     blood pressure monitor Kit; 1 each by Misc.(Non-Drug; Combo Route) route once. for 1 dose  Dispense: 1 each; Refill: 0    3. Hyperlipidemia, unspecified hyperlipidemia type  -     Lipid Panel; Future; Expected date: 07/16/2025    4. Prostate cancer screening  -     PSA, Screening; Future; Expected date: 07/16/2025    5. Other long term (current) drug therapy  -     Hemoglobin A1C; Future; Expected date: 07/16/2025    6. Screening for colorectal cancer  -     Ambulatory referral/consult to Gastroenterology    7. Encounter for immunization  -     pneumoc  20-jose eduardo conj-dip cr(PF) (PREVNAR-20 (PF)) injection Syrg 0.5 mL    8. Biceps tendinitis of left upper extremity  Assessment & Plan:  Physical exam consistent with biceps tendinitis.  Symptoms much improved from last visit. Voltaren gel is working well.  Continue using this as needed.    Orders:  -     diclofenac sodium (VOLTAREN ARTHRITIS PAIN) 1 % Gel; Apply 2 g topically 4 (four) times daily.  Dispense: 50 g; Refill: 3    9. Allergic rhinitis, unspecified seasonality, unspecified trigger  -     montelukast (SINGULAIR) 10 mg tablet; Take 1 tablet (10 mg total) by mouth every evening.  Dispense: 30 tablet; Refill: 0         No follow-ups on file. In addition to their scheduled follow up, the patient has also been instructed to follow up on as needed basis.     No future appointments.       Radha Gonzalez MD

## 2025-07-30 ENCOUNTER — PATIENT MESSAGE (OUTPATIENT)
Dept: FAMILY MEDICINE | Facility: CLINIC | Age: 60
End: 2025-07-30
Payer: COMMERCIAL

## 2025-07-30 RX ORDER — POLYETHYLENE GLYCOL 3350 17 G/17G
17 POWDER, FOR SOLUTION ORAL DAILY
Qty: 850 G | Refills: 3 | Status: SHIPPED | OUTPATIENT
Start: 2025-07-30

## 2025-07-31 ENCOUNTER — TELEPHONE (OUTPATIENT)
Dept: FAMILY MEDICINE | Facility: CLINIC | Age: 60
End: 2025-07-31
Payer: COMMERCIAL

## 2025-07-31 NOTE — TELEPHONE ENCOUNTER
Spoke to patient's wife about scheduling an appointment with our clinic. She stated they will call back and let us know if he wants to see one of our providers.

## 2025-08-01 ENCOUNTER — OFFICE VISIT (OUTPATIENT)
Dept: URGENT CARE | Facility: CLINIC | Age: 60
End: 2025-08-01
Payer: COMMERCIAL

## 2025-08-01 ENCOUNTER — LAB VISIT (OUTPATIENT)
Dept: LAB | Facility: HOSPITAL | Age: 60
End: 2025-08-01
Payer: COMMERCIAL

## 2025-08-01 ENCOUNTER — TELEPHONE (OUTPATIENT)
Dept: FAMILY MEDICINE | Facility: CLINIC | Age: 60
End: 2025-08-01
Payer: COMMERCIAL

## 2025-08-01 VITALS
TEMPERATURE: 98 F | SYSTOLIC BLOOD PRESSURE: 133 MMHG | HEIGHT: 68 IN | OXYGEN SATURATION: 98 % | DIASTOLIC BLOOD PRESSURE: 85 MMHG | BODY MASS INDEX: 25.69 KG/M2 | HEART RATE: 90 BPM | WEIGHT: 169.5 LBS

## 2025-08-01 DIAGNOSIS — I10 PRIMARY HYPERTENSION: ICD-10-CM

## 2025-08-01 DIAGNOSIS — D72.820 LYMPHOCYTOSIS: ICD-10-CM

## 2025-08-01 DIAGNOSIS — Z79.899 OTHER LONG TERM (CURRENT) DRUG THERAPY: ICD-10-CM

## 2025-08-01 DIAGNOSIS — H11.32 SUBCONJUNCTIVAL HEMORRHAGE OF LEFT EYE: Primary | ICD-10-CM

## 2025-08-01 DIAGNOSIS — Z12.5 PROSTATE CANCER SCREENING: ICD-10-CM

## 2025-08-01 DIAGNOSIS — D72.820 LYMPHOCYTOSIS: Primary | ICD-10-CM

## 2025-08-01 DIAGNOSIS — E78.5 HYPERLIPIDEMIA, UNSPECIFIED HYPERLIPIDEMIA TYPE: ICD-10-CM

## 2025-08-01 LAB
ALBUMIN SERPL-MCNC: 4 G/DL (ref 3.4–4.8)
ALBUMIN/GLOB SERPL: 0.9 RATIO (ref 1.1–2)
ALP SERPL-CCNC: 56 UNIT/L (ref 40–150)
ALT SERPL-CCNC: 24 UNIT/L (ref 0–55)
ANION GAP SERPL CALC-SCNC: 5 MEQ/L
AST SERPL-CCNC: 25 UNIT/L (ref 11–45)
BASOPHILS # BLD AUTO: 0.1 X10(3)/MCL
BASOPHILS NFR BLD AUTO: 0.7 %
BILIRUB SERPL-MCNC: 0.5 MG/DL
BUN SERPL-MCNC: 16.4 MG/DL (ref 8.4–25.7)
CALCIUM SERPL-MCNC: 9.9 MG/DL (ref 8.8–10)
CHLORIDE SERPL-SCNC: 104 MMOL/L (ref 98–107)
CHOLEST SERPL-MCNC: 144 MG/DL
CHOLEST/HDLC SERPL: 5 {RATIO} (ref 0–5)
CO2 SERPL-SCNC: 30 MMOL/L (ref 23–31)
CREAT SERPL-MCNC: 1.25 MG/DL (ref 0.72–1.25)
CREAT/UREA NIT SERPL: 13
EOSINOPHIL # BLD AUTO: 0.18 X10(3)/MCL (ref 0–0.9)
EOSINOPHIL NFR BLD AUTO: 1.3 %
ERYTHROCYTE [DISTWIDTH] IN BLOOD BY AUTOMATED COUNT: 14.3 % (ref 11.5–17)
EST. AVERAGE GLUCOSE BLD GHB EST-MCNC: 108.3 MG/DL
GFR SERPLBLD CREATININE-BSD FMLA CKD-EPI: >60 ML/MIN/1.73/M2
GLOBULIN SER-MCNC: 4.3 GM/DL (ref 2.4–3.5)
GLUCOSE SERPL-MCNC: 101 MG/DL (ref 82–115)
HBA1C MFR BLD: 5.4 %
HCT VFR BLD AUTO: 47.4 % (ref 42–52)
HDLC SERPL-MCNC: 31 MG/DL (ref 35–60)
HGB BLD-MCNC: 15.5 G/DL (ref 14–18)
IMM GRANULOCYTES # BLD AUTO: 0.03 X10(3)/MCL (ref 0–0.04)
IMM GRANULOCYTES NFR BLD AUTO: 0.2 %
LDLC SERPL CALC-MCNC: 90 MG/DL (ref 50–140)
LYMPHOCYTES # BLD AUTO: 6.93 X10(3)/MCL (ref 0.6–4.6)
LYMPHOCYTES NFR BLD AUTO: 50.5 %
MCH RBC QN AUTO: 28.1 PG (ref 27–31)
MCHC RBC AUTO-ENTMCNC: 32.7 G/DL (ref 33–36)
MCV RBC AUTO: 85.9 FL (ref 80–94)
MONOCYTES # BLD AUTO: 1.06 X10(3)/MCL (ref 0.1–1.3)
MONOCYTES NFR BLD AUTO: 7.7 %
NEUTROPHILS # BLD AUTO: 5.42 X10(3)/MCL (ref 2.1–9.2)
NEUTROPHILS NFR BLD AUTO: 39.6 %
NRBC BLD AUTO-RTO: 0 %
PLATELET # BLD AUTO: 379 X10(3)/MCL (ref 130–400)
PMV BLD AUTO: 9.9 FL (ref 7.4–10.4)
POTASSIUM SERPL-SCNC: 3.8 MMOL/L (ref 3.5–5.1)
PROT SERPL-MCNC: 8.3 GM/DL (ref 5.8–7.6)
PSA SERPL-MCNC: 0.27 NG/ML
RBC # BLD AUTO: 5.52 X10(6)/MCL (ref 4.7–6.1)
SODIUM SERPL-SCNC: 139 MMOL/L (ref 136–145)
TRIGL SERPL-MCNC: 114 MG/DL (ref 34–140)
TSH SERPL-ACNC: 1.32 UIU/ML (ref 0.35–4.94)
VLDLC SERPL CALC-MCNC: 23 MG/DL
WBC # BLD AUTO: 13.72 X10(3)/MCL (ref 4.5–11.5)

## 2025-08-01 PROCEDURE — 84153 ASSAY OF PSA TOTAL: CPT

## 2025-08-01 PROCEDURE — 36415 COLL VENOUS BLD VENIPUNCTURE: CPT

## 2025-08-01 PROCEDURE — 84443 ASSAY THYROID STIM HORMONE: CPT

## 2025-08-01 PROCEDURE — 83036 HEMOGLOBIN GLYCOSYLATED A1C: CPT

## 2025-08-01 PROCEDURE — 80061 LIPID PANEL: CPT

## 2025-08-01 PROCEDURE — 85025 COMPLETE CBC W/AUTO DIFF WBC: CPT

## 2025-08-01 PROCEDURE — 80053 COMPREHEN METABOLIC PANEL: CPT

## 2025-08-01 NOTE — PROGRESS NOTES
"Subjective:      Patient ID: Harjit Handley is a 59 y.o. male.    Vitals:  height is 5' 8" (1.727 m) and weight is 76.9 kg (169 lb 8 oz). His temperature is 97.9 °F (36.6 °C). His blood pressure is 133/85 and his pulse is 90. His oxygen saturation is 98%.     Chief Complaint: Other Misc (Eye Completely Red, experiencing some pain, but went into work, not sure if it's pink eye was sent to check it out.   Have been to this clinic before - Entered by patient)    59 y.o. male presents to clinic with c/o redness to left eye starting two days ago. Visual acuity right eye 20/70. Pt does wear glasses but does not have them for visit.  No visual disturbances or foreign body sensation, no recent injury or trauma.  Mild discomfort no pain now      Eyes:  Positive for eye redness. Negative for eye trauma, foreign body in eye, eye discharge, eye itching, eye pain, photophobia, vision loss, double vision, blurred vision and eyelid swelling.      Objective:     Physical Exam   HENT:   Head: Normocephalic.   Ears:   Right Ear: Tympanic membrane, external ear and ear canal normal.   Left Ear: Tympanic membrane and ear canal normal.   Nose: Nose normal.   Mouth/Throat: Mucous membranes are moist.   Eyes: Lids are normal. Pupils are equal, round, and reactive to light. Lids are everted and swept, no foreign bodies found. Right eye exhibits no discharge. No foreign body present in the right eye. Left eye exhibits no discharge. No foreign body present in the left eye.     vision grossly intact gaze aligned appropriately      Comments: Dark red discoloration to the entire left eye, no exudate drainage, no foreign body sensation or visual disturbances   Abdominal: Normal appearance.   Neurological: He is alert.   Nursing note and vitals reviewed.    Previous History:     Review of patient's allergies indicates:  No Known Allergies    Past Medical History:   Diagnosis Date    GERD (gastroesophageal reflux disease)     Hyperlipidemia     " Hypertension     Seasonal allergies     Not sure    Seizures      Current Outpatient Medications   Medication Instructions    albuterol (VENTOLIN HFA) 90 mcg/actuation inhaler 2 puffs, Inhalation, Every 6 hours PRN, Rescue    azelastine (ASTELIN) 137 mcg, Nasal, 2 times daily    cetirizine (ZYRTEC) 10 mg, Oral, Daily    diclofenac sodium (VOLTAREN ARTHRITIS PAIN) 2 g, Topical (Top), 4 times daily    fluticasone propionate (FLONASE) 50 mcg/actuation nasal spray 2 sprays, 2 times daily    losartan-hydrochlorothiazide 100-12.5 mg (HYZAAR) 100-12.5 mg Tab 1 tablet, Oral, Daily    montelukast (SINGULAIR) 10 mg, Oral, Nightly    nicotine polacrilex 2 mg, Oral, Every 2 hours PRN    pantoprazole (PROTONIX) 40 mg, Oral    polyethylene glycol (GLYCOLAX) 17 g, Oral, Daily    rosuvastatin (CRESTOR) 10 MG tablet TAKE ONE TABLET BY MOUTH ONCE A DAY    tadalafiL (CIALIS) 5 mg, Oral, Daily PRN, May start with half tablet daily. Can increase to 1 tablet after a week if needed.    traZODone (DESYREL) 50 mg, Oral, Nightly     Past Surgical History:   Procedure Laterality Date    COLONOSCOPY      HERNIA REPAIR       Family History   Problem Relation Name Age of Onset    Cancer Mother Mother         Colon cancer    Diabetes Mother Mother     Hypertension Mother Mother     Alcohol abuse Mother Mother     Arthritis Mother Mother     Asthma Mother Mother     COPD Mother Mother     Heart disease Mother Mother     Vision loss Mother Mother     Cancer Father J     Alcohol abuse Father J     Diabetes Maternal Grandmother Grandmother     Stroke Maternal Grandmother Grandmother        Social History[1]  Assessment:     1. Subconjunctival hemorrhage of left eye        Plan:     May use refresh or artificial tears OTC as directed.  Tylenol or ibuprofen OTC as directed for pain, Benadryl for itching  Follow up with an ophthalmologist if symptoms worsen or persist as instructed, for visual disturbances, foreign body sensation or for  concerns    Subconjunctival hemorrhage of left eye                         [1]   Social History  Tobacco Use    Smoking status: Every Day     Current packs/day: 1.50     Average packs/day: 1.6 packs/day for 18.8 years (30.1 ttl pk-yrs)     Types: Cigarettes, Vaping with nicotine     Passive exposure: Never    Smokeless tobacco: Never    Tobacco comments:     Currently a Smoker - Classes didnt work with my work schedule and 2asntvable to keep missing work.   Substance Use Topics    Alcohol use: Not Currently     Alcohol/week: 12.0 standard drinks of alcohol     Comment: Usually drink beer when I drink    Drug use: Never

## 2025-08-01 NOTE — PATIENT INSTRUCTIONS
May use refresh or artificial tears OTC as directed.  Tylenol or ibuprofen OTC as directed for pain, Benadryl for itching  Follow up with an ophthalmologist if symptoms worsen or persist as instructed, for visual disturbances, foreign body sensation or for concerns

## 2025-08-07 ENCOUNTER — PATIENT MESSAGE (OUTPATIENT)
Dept: FAMILY MEDICINE | Facility: CLINIC | Age: 60
End: 2025-08-07
Payer: COMMERCIAL

## 2025-08-07 ENCOUNTER — RESULTS FOLLOW-UP (OUTPATIENT)
Dept: FAMILY MEDICINE | Facility: CLINIC | Age: 60
End: 2025-08-07
Payer: COMMERCIAL

## 2025-08-07 DIAGNOSIS — Z12.11 SCREENING FOR COLORECTAL CANCER: ICD-10-CM

## 2025-08-07 DIAGNOSIS — D72.820 LYMPHOCYTOSIS: Primary | ICD-10-CM

## 2025-08-07 DIAGNOSIS — Z12.12 SCREENING FOR COLORECTAL CANCER: ICD-10-CM

## 2025-08-07 LAB — HEMATOLOGIST REVIEW: NORMAL

## 2025-08-11 DIAGNOSIS — Z12.11 COLON CANCER SCREENING: Primary | ICD-10-CM

## 2025-08-11 RX ORDER — POLYETHYLENE GLYCOL 3350, SODIUM SULFATE, SODIUM CHLORIDE, POTASSIUM CHLORIDE, SODIUM ASCORBATE, AND ASCORBIC ACID 7.5-2.691G
KIT ORAL
Qty: 1 KIT | Refills: 0 | Status: SHIPPED | OUTPATIENT
Start: 2025-08-11